# Patient Record
Sex: MALE | Race: WHITE | NOT HISPANIC OR LATINO | Employment: FULL TIME | URBAN - NONMETROPOLITAN AREA
[De-identification: names, ages, dates, MRNs, and addresses within clinical notes are randomized per-mention and may not be internally consistent; named-entity substitution may affect disease eponyms.]

---

## 2017-01-23 ENCOUNTER — TELEPHONE (OUTPATIENT)
Dept: UROLOGY | Facility: OTHER | Age: 32
End: 2017-01-23

## 2017-01-23 NOTE — TELEPHONE ENCOUNTER
"Please call patient back asap. Patient received message (from us) confirming his appointment time for tomorrow 1/24/17.  Patient thought his next appointment was not until \"sometime in February\" I explained that I did not see a February appointment set.  Patient wondered if \"someone here\" moved it up for him for some reason and did not notify him?? He could make the 1/24/17 appointment time - please call him to discuss as soon as possible.  Thank you  "

## 2017-01-24 ENCOUNTER — TELEPHONE (OUTPATIENT)
Dept: UROLOGY | Facility: OTHER | Age: 32
End: 2017-01-24

## 2017-01-24 NOTE — TELEPHONE ENCOUNTER
I talked to Nhan and he wanted to reschedule for Feb.  He will have his labs done on Feb. 10th before 10 am, which is atleast a week before his appt.

## 2017-01-24 NOTE — TELEPHONE ENCOUNTER
I notified patient's wife that I did the prior auth for testosterone, and will notify her when I get the approval/denial.  Maame Foster LPN

## 2017-02-15 ENCOUNTER — DOCUMENTATION ONLY (OUTPATIENT)
Dept: UROLOGY | Facility: OTHER | Age: 32
End: 2017-02-15

## 2017-02-15 ENCOUNTER — TELEPHONE (OUTPATIENT)
Dept: UROLOGY | Facility: OTHER | Age: 32
End: 2017-02-15

## 2017-02-15 NOTE — TELEPHONE ENCOUNTER
With written permission, I left a message explaining that I received a phone call from the insurance regarding the prior auth and the medication has been approved, and he can pick it up at his pharmacy, and if he has any questions, to call [phone].  Maame Foster LPN

## 2017-02-15 NOTE — PROGRESS NOTES
I spoke to patient's  today, his T was denied.  I have done everything correctly and resubmitted the information, it should not have been denied.  Janette Nguyen  CNP, CWOCN  Urology and Wound Care

## 2017-03-01 ENCOUNTER — TELEPHONE (OUTPATIENT)
Dept: UROLOGY | Facility: OTHER | Age: 32
End: 2017-03-01

## 2017-03-01 NOTE — TELEPHONE ENCOUNTER
With written permission, I left a message explaining that the prior auth denial has been overturned, and that I faxed the letter to his insurance company, and that he should be able to pick the Testosterone today, and if he has any questins,to call [phone].  Maame Foster LPN

## 2017-03-03 DIAGNOSIS — E29.1 TESTICULAR HYPOFUNCTION: ICD-10-CM

## 2017-03-03 LAB — HCT VFR BLD AUTO: 44.1 % (ref 40–53)

## 2017-03-03 PROCEDURE — 85014 HEMATOCRIT: CPT | Performed by: NURSE PRACTITIONER

## 2017-03-03 PROCEDURE — 99000 SPECIMEN HANDLING OFFICE-LAB: CPT | Performed by: NURSE PRACTITIONER

## 2017-03-03 PROCEDURE — 84403 ASSAY OF TOTAL TESTOSTERONE: CPT | Mod: 90 | Performed by: NURSE PRACTITIONER

## 2017-03-03 PROCEDURE — 36415 COLL VENOUS BLD VENIPUNCTURE: CPT | Performed by: NURSE PRACTITIONER

## 2017-03-08 LAB — TESTOST SERPL-MCNC: 403 NG/DL (ref 240–950)

## 2017-03-14 ENCOUNTER — OFFICE VISIT (OUTPATIENT)
Dept: UROLOGY | Facility: OTHER | Age: 32
End: 2017-03-14
Attending: NURSE PRACTITIONER
Payer: COMMERCIAL

## 2017-03-14 VITALS
SYSTOLIC BLOOD PRESSURE: 124 MMHG | BODY MASS INDEX: 46.65 KG/M2 | HEIGHT: 69 IN | HEART RATE: 96 BPM | WEIGHT: 315 LBS | DIASTOLIC BLOOD PRESSURE: 88 MMHG | TEMPERATURE: 98.8 F

## 2017-03-14 DIAGNOSIS — E29.1 TESTICULAR HYPOFUNCTION: ICD-10-CM

## 2017-03-14 PROCEDURE — 99213 OFFICE O/P EST LOW 20 MIN: CPT | Performed by: NURSE PRACTITIONER

## 2017-03-14 ASSESSMENT — PAIN SCALES - GENERAL: PAINLEVEL: NO PAIN (0)

## 2017-03-14 NOTE — MR AVS SNAPSHOT
After Visit Summary   3/14/2017    Nhan Sánchez    MRN: [id number]           Patient Information     Date Of Birth          1985        Visit Information        Provider Department      3/14/2017 4:00 PM Janette Nguyen NP [facility]        Today's Diagnoses     Testicular hypofunction          Care Instructions    Please have your labs done again in June and see me about a week later.    We may consider a small increase in your dose then.    Continue the same dose, I did reorder it.        Follow-ups after your visit        Who to contact     If you have questions or need follow up information about today's clinic visit or your schedule please contact [facility] directly at [phone].  Normal or non-critical lab and imaging results will be communicated to you by Zignalshart, letter or phone within 4 business days after the clinic has received the results. If you do not hear from us within 7 days, please contact the clinic through Zignalshart or phone. If you have a critical or abnormal lab result, we will notify you by phone as soon as possible.  Submit refill requests through DearLocal or call your pharmacy and they will forward the refill request to us. Please allow 3 business days for your refill to be completed.          Additional Information About Your Visit        MyChart Information     DearLocal gives you secure access to your electronic health record. If you see a primary care provider, you can also send messages to your care team and make appointments. If you have questions, please call your primary care clinic.  If you do not have a primary care provider, please call [phone] and they will assist you.        Care EveryWhere ID     This is your Care EveryWhere ID. This could be used by other organizations to access your [facility] medical records  [id number]        Your Vitals Were     Pulse Temperature Height BMI (Body Mass Index)          96 98.8  F (37.1  C)  "(Tympanic) 1.753 m (5' 9\") 48.73 kg/m2         Blood Pressure from Last 3 Encounters:   03/14/17 124/88   12/09/16 120/80   10/27/16 134/82    Weight from Last 3 Encounters:   03/14/17 (!) 149.7 kg (330 lb)   12/09/16 (!) 151.6 kg (334 lb 3.2 oz)   10/27/16 (!) 147.9 kg (326 lb)              Today, you had the following     No orders found for display       Primary Care Provider    None Specified       No primary provider on file.        Thank you!     Thank you for choosing [facility]  for your care. Our goal is always to provide you with excellent care. Hearing back from our patients is one way we can continue to improve our services. Please take a few minutes to complete the written survey that you may receive in the mail after your visit with us. Thank you!             Your Updated Medication List - Protect others around you: Learn how to safely use, store and throw away your medicines at www.disposemymeds.org.          This list is accurate as of: 3/14/17  4:31 PM.  Always use your most recent med list.                   Brand Name Dispense Instructions for use    escitalopram 20 MG tablet    LEXAPRO    30 tablet    Take 1 tablet (20 mg) by mouth daily       ibuprofen 800 MG tablet    ADVIL/MOTRIN    90 tablet    Take 1 tablet (800 mg) by mouth every 8 hours as needed for moderate pain       LORazepam 1 MG tablet    ATIVAN    60 tablet    One po BID PRN anxiety       testosterone cypionate 200 MG/ML injection    DEPOTESTOTERONE CYPIONATE    2 mL    Inject 1 mL (200 mg) into the muscle every 14 days         "

## 2017-03-14 NOTE — NURSING NOTE
"Chief Complaint   Patient presents with     RECHECK     Patient presents for follow up for low Testosterone.       Initial /88 (BP Location: Right arm, Patient Position: Chair, Cuff Size: Adult Large)  Pulse 96  Temp 98.8  F (37.1  C) (Tympanic)  Ht 1.753 m (5' 9\")  Wt (!) 149.7 kg (330 lb)  BMI 48.73 kg/m2 Estimated body mass index is 48.73 kg/(m^2) as calculated from the following:    Height as of this encounter: 1.753 m (5' 9\").    Weight as of this encounter: 149.7 kg (330 lb).  Medication Reconciliation: complete   Maame Foster LPN      "

## 2017-03-14 NOTE — PATIENT INSTRUCTIONS
Please have your labs done again in June and see me about a week later.    We may consider a small increase in your dose then.    Continue the same dose, I did reorder it.

## 2017-03-15 RX ORDER — TESTOSTERONE CYPIONATE 200 MG/ML
200 INJECTION, SOLUTION INTRAMUSCULAR
Qty: 2 ML | Refills: 3 | Status: SHIPPED | OUTPATIENT
Start: 2017-03-15 | End: 2019-01-21

## 2017-03-15 NOTE — PROGRESS NOTES
CC: low testosterone    HPI: Mr. Nhan Sánchez is a very pleasant 31 year old male seen today in the urology clinic for follow up regarding the need for testosterone supplementation.   He was last seen by myself on 10/26/16.  He was supposed to see me again at the end of January, but did not make it then.  His initial T (testosterone level) was low at 153.  His wife has been administering 200 mg of testosterone every 14 days at home.  His last T level was done on 3/3/17 and it was 403.  His hct is 44.1.   The primary symptoms the patient has in regards to his testicular hypofunction are severe lethargy and lack of libido.   He also has some minimal erectile dysfunction component with difficulty maintaining an erection.  He feels these symptoms are better with testosterone and would like to continue therapy.    Review Of Systems  Skin: tattoos  Eyes: negative  Ears/Nose/Throat: negative  Respiratory: Shortness of breath- at times even with little exertion  Cardiovascular: chest pain  Gastrointestinal: abdominal pain  Genitourinary: negative  Musculoskeletal: back pain and arthritis  Neurologic: headaches  Psychiatric: anxiety  Hematologic/Lymphatic/Immunologic: negative  Endocrine: negative    Current Outpatient Prescriptions   Medication     LORazepam (ATIVAN) 1 MG tablet     escitalopram (LEXAPRO) 20 MG tablet     ibuprofen (ADVIL,MOTRIN) 800 MG tablet     testosterone cypionate (DEPOTESTOTERONE CYPIONATE) 200 MG/ML injection     No current facility-administered medications for this visit.      Allergies   Allergen Reactions     Azithromycin Other (See Comments)     Zithromax - urticaria     Diphenhydramine Hcl Hives     Benadryl     Dust Mites        Past Surgical History   Procedure Laterality Date     [place] teeth extracted       Tonsillectomy       Circumcision       Colonoscopy N/A 4/11/2016     Procedure: COLONOSCOPY;  Surgeon: aMrely Hunt MD;  Location: [facility] OR     Past Medical History   Diagnosis Date  "    Cigarette nicotine dependence without complication 11/27/2015     Unspecified urticaria 12/12/2004     Social History     Social History     Marital status:      Spouse name: N/A     Number of children: N/A     Years of education: N/A     Occupational History     Not on file.     Social History Main Topics     Smoking status: Former Smoker     Packs/day: 0.50     Years: 13.00     Types: Cigarettes     Smokeless tobacco: Never Used      Comment: tried to quit - longest period tobacco free: 2 years - no passive smoke exposure     Alcohol use 0.0 oz/week     0 Standard drinks or equivalent per week      Comment: occasion     Drug use: No     Sexual activity: Not on file     Other Topics Concern     Blood Transfusions Yes     Caffeine Concern Yes     16 oz coffee, soda daily     Exercise Yes     walking, weights     Parent/Sibling W/ Cabg, Mi Or Angioplasty Before 65f 55m? No     Social History Narrative     Family History   Problem Relation Age of Onset     C.A.D. Other      family h/o     CEREBROVASCULAR DISEASE Other      CVA - family h/o       PHYSICAL EXAM:   /88 (BP Location: Right arm, Patient Position: Chair, Cuff Size: Adult Large)  Pulse 96  Temp 98.8  F (37.1  C) (Tympanic)  Ht 5' 9\" (1.753 m)  Wt (!) 330 lb (149.7 kg)  BMI 48.73 kg/m2  GENERAL: Well groomed, well developed, well nourished male in no obvious distress.  HEENT: extraocular movements intact, normocephalic.  SKIN: Warm to touch, dry.  No visible rashes or lesions on examined areas.  RESP: No increased respiratory effort. Lungs clear to auscultation bilateral.  CV: RRR, no murmurs/rubs/gallops.  LYMPH: No LE edema.  ABD: Soft, mild tenderness in LLQ, non-distended, no palpable masses.  Quiet BS.  No CVA tenderness bilaterally.  MS: Full ROM in extremities.  MINOO: deferred, done at the last visit  NEURO: Alert and oriented x 3.  PSYCH: Normal mood and affect, pleasant and agreeable during interview and exam.    ASSESSMENT/PLAN:  Mr." Nhan Sánchez is a very pleasant 31 year old male with a history of low testosterone with symptoms.    He would like to continue therapy.  I would like him to remain on the same dose.  He will have his labs done again sometime in June, mid way between injections and see me a week later.  I may consider a small increase in his dose if needed then.    I have enjoyed participating in the medical care of this very pleasant patient.  Using layterms, and diagrams when needed, I explained the pathophysiology, usual course, potential complications, recommended monitoring, preventive modalities, treatment rationale, risks, and benefits to Mr. Nhan Sánchez. The patient appeared to understand and all questions were satisfactorily answered.      Janette Nguyen  CNP, CWOCN  Urology and Wound Care  March 15, 2017

## 2017-04-04 DIAGNOSIS — F41.9 ANXIETY: ICD-10-CM

## 2017-04-06 RX ORDER — ESCITALOPRAM OXALATE 20 MG/1
TABLET ORAL
Qty: 30 TABLET | Refills: 5 | Status: SHIPPED | OUTPATIENT
Start: 2017-04-06 | End: 2019-01-20

## 2017-06-02 ENCOUNTER — TRANSFERRED RECORDS (OUTPATIENT)
Dept: HEALTH INFORMATION MANAGEMENT | Facility: HOSPITAL | Age: 32
End: 2017-06-02

## 2017-06-03 ENCOUNTER — TRANSFERRED RECORDS (OUTPATIENT)
Dept: HEALTH INFORMATION MANAGEMENT | Facility: HOSPITAL | Age: 32
End: 2017-06-03

## 2017-06-09 ENCOUNTER — OFFICE VISIT (OUTPATIENT)
Dept: FAMILY MEDICINE | Facility: OTHER | Age: 32
End: 2017-06-09
Attending: NURSE PRACTITIONER
Payer: COMMERCIAL

## 2017-06-09 VITALS
OXYGEN SATURATION: 94 % | RESPIRATION RATE: 20 BRPM | HEIGHT: 69 IN | TEMPERATURE: 98.8 F | DIASTOLIC BLOOD PRESSURE: 76 MMHG | HEART RATE: 100 BPM | WEIGHT: 315 LBS | BODY MASS INDEX: 46.65 KG/M2 | SYSTOLIC BLOOD PRESSURE: 162 MMHG

## 2017-06-09 DIAGNOSIS — S42.202A: ICD-10-CM

## 2017-06-09 DIAGNOSIS — T14.8XXA SKIN ABRASION: ICD-10-CM
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
## 2017-06-09 PROCEDURE — 99214 OFFICE O/P EST MOD 30 MIN: CPT | Performed by: NURSE PRACTITIONER

## 2017-06-09 RX ORDER — MUPIROCIN 20 MG/G
OINTMENT TOPICAL 3 TIMES DAILY
Qty: 60 G | Refills: 5 | Status: SHIPPED | OUTPATIENT
Start: 2017-06-09 | End: 2017-06-14

## 2017-06-09 ASSESSMENT — PAIN SCALES - GENERAL: PAINLEVEL: MILD PAIN (2)
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
## 2017-06-09 NOTE — NURSING NOTE
"Chief Complaint   Patient presents with     Musculoskeletal Problem     follow up  wants redreased gauze raodrash on left arm       Hypertension     elevated bp        Initial /76 (BP Location: Right arm, Patient Position: Chair, Cuff Size: Adult Large)  Pulse 100  Temp 98.8  F (37.1  C) (Tympanic)  Resp 20  Ht 5' 9\" (1.753 m)  Wt (!) 339 lb 14.4 oz (154.2 kg)  SpO2 94%  BMI 50.19 kg/m2 Estimated body mass index is 50.19 kg/(m^2) as calculated from the following:    Height as of this encounter: 5' 9\" (1.753 m).    Weight as of this encounter: 339 lb 14.4 oz (154.2 kg).  Medication Reconciliation: complete   Pamela M Lechevalier LPN      "
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
## 2017-06-15 ENCOUNTER — TELEPHONE (OUTPATIENT)
Dept: FAMILY MEDICINE | Facility: OTHER | Age: 32
End: 2017-06-15

## 2017-06-15 NOTE — TELEPHONE ENCOUNTER
Pt states left arm healed and right arm closing up and swelling down has still been doing daily dressing changes. Feels 100% better.  Pamela M Lechevalier LPN

## 2017-06-26 ENCOUNTER — OFFICE VISIT (OUTPATIENT)
Dept: FAMILY MEDICINE | Facility: OTHER | Age: 32
End: 2017-06-26
Attending: NURSE PRACTITIONER
Payer: COMMERCIAL

## 2017-06-26 VITALS
HEIGHT: 69 IN | HEART RATE: 100 BPM | SYSTOLIC BLOOD PRESSURE: 126 MMHG | WEIGHT: 315 LBS | OXYGEN SATURATION: 97 % | RESPIRATION RATE: 16 BRPM | BODY MASS INDEX: 46.65 KG/M2 | TEMPERATURE: 98.1 F | DIASTOLIC BLOOD PRESSURE: 78 MMHG

## 2017-06-26 DIAGNOSIS — M25.522 LEFT ELBOW PAIN: Primary | ICD-10-CM

## 2017-06-26 DIAGNOSIS — L03.114 CELLULITIS OF LEFT ARM: ICD-10-CM

## 2017-06-26 PROCEDURE — 73070 X-RAY EXAM OF ELBOW: CPT | Mod: TC | Performed by: RADIOLOGY

## 2017-06-26 PROCEDURE — 99213 OFFICE O/P EST LOW 20 MIN: CPT | Performed by: NURSE PRACTITIONER

## 2017-06-26 RX ORDER — CLINDAMYCIN HCL 150 MG
150 CAPSULE ORAL 3 TIMES DAILY
Qty: 30 CAPSULE | Refills: 0 | Status: SHIPPED | OUTPATIENT
Start: 2017-06-26 | End: 2019-01-20

## 2017-06-26 ASSESSMENT — PAIN SCALES - GENERAL: PAINLEVEL: MILD PAIN (2)

## 2017-06-26 NOTE — MR AVS SNAPSHOT
After Visit Summary   6/26/2017    Nhan Sánchez    MRN: [id number]           Patient Information     Date Of Birth          1985        Visit Information        Provider Department      6/26/2017 11:30 AM Chelsey Cornejo NP [facility]        Today's Diagnoses     Left elbow pain    -  1    Cellulitis of left arm          Care Instructions      ASSESSMENT/PLAN:         1. Left elbow pain  No evidence of osteomyelitis on XR  - XR ELBOW LT 2 VW (Clinic Performed); Future  - XR ELBOW LT 2 VW (Clinic Performed)    2. Cellulitis of left arm  Continue Bactroban, wrap as discussed  - clindamycin (CLEOCIN) 150 MG capsule; Take 1 capsule (150 mg) by mouth 3 times daily  Dispense: 30 capsule; Refill: 0    Continue following with ortho as scheduled    FUTURE APPOINTMENTS:       - Follow-up visit if no improvement or any worsening.     Chelsey Cornejo NP  [facility]            Follow-ups after your visit        Who to contact     If you have questions or need follow up information about today's clinic visit or your schedule please contact [facility] directly at [phone].  Normal or non-critical lab and imaging results will be communicated to you by [employer], letter or phone within 4 business days after the clinic has received the results. If you do not hear from us within 7 days, please contact the clinic through Swifto[employer] or phone. If you have a critical or abnormal lab result, we will notify you by phone as soon as possible.  Submit refill requests through [employer] or call your pharmacy and they will forward the refill request to us. Please allow 3 business days for your refill to be completed.          Additional Information About Your Visit        MyChart Information     [employer] gives you secure access to your electronic health record. If you see a primary care provider, you can also send messages to your care team and make appointments. If you  "have questions, please call your primary care clinic.  If you do not have a primary care provider, please call [phone] and they will assist you.        Care EveryWhere ID     This is your Care EveryWhere ID. This could be used by other organizations to access your [facility] medical records  [id number]        Your Vitals Were     Pulse Temperature Respirations Height Pulse Oximetry BMI (Body Mass Index)    100 98.1  F (36.7  C) (Tympanic) 16 5' 9\" (1.753 m) 97% 48.14 kg/m2       Blood Pressure from Last 3 Encounters:   06/26/17 126/78   06/09/17 162/76   03/14/17 124/88    Weight from Last 3 Encounters:   06/26/17 (!) 326 lb (147.9 kg)   06/09/17 (!) 339 lb 14.4 oz (154.2 kg)   03/14/17 (!) 330 lb (149.7 kg)              We Performed the Following     XR ELBOW LT 2 VW (Clinic Performed)          Today's Medication Changes          These changes are accurate as of: 6/26/17 11:44 AM.  If you have any questions, ask your nurse or doctor.               Start taking these medicines.        Dose/Directions    clindamycin 150 MG capsule   Commonly known as:  CLEOCIN   Used for:  Cellulitis of left arm   Started by:  Chelsey Cornejo, MATTHIEU        Dose:  150 mg   Take 1 capsule (150 mg) by mouth 3 times daily   Quantity:  30 capsule   Refills:  0            Where to get your medicines      These medications were sent to [facility] [address] AT [facility] [address]  [address] [facility] [address]     Phone:  [phone]     clindamycin 150 MG capsule                Primary Care Provider    None Specified       No primary provider on file.        Equal Access to Services     RAHUL DENTON AH: Kristina Ramsay, mere bateman, jennifer saini, chris baptiste. So [facility] [phone].    ATENCIÓN: Si habla español, tiene a li disposición servicios gratuitos de asistencia lingüística. Llame al [phone].    We " comply with applicable federal civil rights laws and [place] laws. We do not discriminate on the basis of race, color, national origin, age, disability sex, sexual orientation or gender identity.            Thank you!     Thank you for choosing [facility]  for your care. Our goal is always to provide you with excellent care. Hearing back from our patients is one way we can continue to improve our services. Please take a few minutes to complete the written survey that you may receive in the mail after your visit with us. Thank you!             Your Updated Medication List - Protect others around you: Learn how to safely use, store and throw away your medicines at www.disposemymeds.org.          This list is accurate as of: 6/26/17 11:44 AM.  Always use your most recent med list.                   Brand Name Dispense Instructions for use Diagnosis    ASPIRIN PO      Take 81 mg by mouth        clindamycin 150 MG capsule    CLEOCIN    30 capsule    Take 1 capsule (150 mg) by mouth 3 times daily    Cellulitis of left arm       escitalopram 20 MG tablet    LEXAPRO    30 tablet    TAKE 1 TABLET BY MOUTH EVERY DAY    Anxiety       ibuprofen 800 MG tablet    ADVIL/MOTRIN    90 tablet    Take 1 tablet (800 mg) by mouth every 8 hours as needed for moderate pain    Back strain, initial encounter       LORazepam 1 MG tablet    ATIVAN    60 tablet    One po BID PRN anxiety    Anxiety       order for DME     1 each    Xerofrom petrolatum gauze - 8 daily dispense qty 80 plain sterile gauze - 4 per per day, dispense qty 40 Coban 4 inch - 4 per day, dispense 40   Change dressing daily until healed.    Skin abrasion       OXYCODONE HCL PO      Take 5 mg by mouth        testosterone cypionate 200 MG/ML injection    DEPOTESTOTERONE    2 mL    Inject 1 mL (200 mg) into the muscle every 14 days    Testicular hypofunction
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
## 2017-06-26 NOTE — PATIENT INSTRUCTIONS
ASSESSMENT/PLAN:         1. Left elbow pain  No evidence of osteomyelitis on XR  - XR ELBOW LT 2 VW (Clinic Performed); Future  - XR ELBOW LT 2 VW (Clinic Performed)    2. Cellulitis of left arm  Continue Bactroban, wrap as discussed  - clindamycin (CLEOCIN) 150 MG capsule; Take 1 capsule (150 mg) by mouth 3 times daily  Dispense: 30 capsule; Refill: 0    Continue following with ortho as scheduled    FUTURE APPOINTMENTS:       - Follow-up visit if no improvement or any worsening.     Chelsey Cornejo, NP  [facility]

## 2019-01-21 ENCOUNTER — OFFICE VISIT (OUTPATIENT)
Dept: FAMILY MEDICINE | Facility: OTHER | Age: 34
End: 2019-01-21
Attending: NURSE PRACTITIONER
Payer: COMMERCIAL

## 2019-01-21 VITALS
DIASTOLIC BLOOD PRESSURE: 78 MMHG | WEIGHT: 230 LBS | TEMPERATURE: 99 F | HEART RATE: 94 BPM | BODY MASS INDEX: 34.07 KG/M2 | SYSTOLIC BLOOD PRESSURE: 118 MMHG | RESPIRATION RATE: 14 BRPM | OXYGEN SATURATION: 95 % | HEIGHT: 69 IN

## 2019-01-21 DIAGNOSIS — J98.01 BRONCHOSPASM: ICD-10-CM

## 2019-01-21 DIAGNOSIS — R05.9 COUGH: Primary | ICD-10-CM

## 2019-01-21 PROCEDURE — 99213 OFFICE O/P EST LOW 20 MIN: CPT | Performed by: NURSE PRACTITIONER

## 2019-01-21 RX ORDER — ALBUTEROL SULFATE 90 UG/1
2 AEROSOL, METERED RESPIRATORY (INHALATION) EVERY 6 HOURS
Qty: 1 INHALER | Refills: 1 | Status: SHIPPED | OUTPATIENT
Start: 2019-01-21 | End: 2020-08-31

## 2019-01-21 ASSESSMENT — PATIENT HEALTH QUESTIONNAIRE - PHQ9
SUM OF ALL RESPONSES TO PHQ QUESTIONS 1-9: 1
5. POOR APPETITE OR OVEREATING: NOT AT ALL

## 2019-01-21 ASSESSMENT — MIFFLIN-ST. JEOR: SCORE: 1983.65

## 2019-01-21 ASSESSMENT — PAIN SCALES - GENERAL: PAINLEVEL: NO PAIN (0)

## 2019-01-21 ASSESSMENT — ANXIETY QUESTIONNAIRES
6. BECOMING EASILY ANNOYED OR IRRITABLE: SEVERAL DAYS
IF YOU CHECKED OFF ANY PROBLEMS ON THIS QUESTIONNAIRE, HOW DIFFICULT HAVE THESE PROBLEMS MADE IT FOR YOU TO DO YOUR WORK, TAKE CARE OF THINGS AT HOME, OR GET ALONG WITH OTHER PEOPLE: NOT DIFFICULT AT ALL
5. BEING SO RESTLESS THAT IT IS HARD TO SIT STILL: NOT AT ALL
7. FEELING AFRAID AS IF SOMETHING AWFUL MIGHT HAPPEN: NOT AT ALL
3. WORRYING TOO MUCH ABOUT DIFFERENT THINGS: SEVERAL DAYS
1. FEELING NERVOUS, ANXIOUS, OR ON EDGE: SEVERAL DAYS
2. NOT BEING ABLE TO STOP OR CONTROL WORRYING: NOT AT ALL
GAD7 TOTAL SCORE: 3

## 2019-01-21 NOTE — NURSING NOTE
"Chief Complaint   Patient presents with     Cough       Initial /78 (BP Location: Left arm, Patient Position: Sitting, Cuff Size: Adult Large)   Pulse 94   Temp 99  F (37.2  C) (Tympanic)   Resp 14   Ht 1.753 m (5' 9\")   Wt 104.3 kg (230 lb)   SpO2 95%   BMI 33.97 kg/m   Estimated body mass index is 33.97 kg/m  as calculated from the following:    Height as of this encounter: 1.753 m (5' 9\").    Weight as of this encounter: 104.3 kg (230 lb).  Medication Reconciliation: complete    Kerry Champagne LPN    "

## 2019-01-21 NOTE — PATIENT INSTRUCTIONS
ASSESSMENT/PLAN:       1. Cough  2. Bronchospasm  - albuterol (PROAIR HFA/PROVENTIL HFA/VENTOLIN HFA) 108 (90 Base) MCG/ACT inhaler; Inhale 2 puffs into the lungs every 6 hours  Dispense: 1 Inhaler; Refill: 1      Fluids  Rest  Temp control at home  Humidity at home - add bacteriostatic solution to humidifier  Add Zicam or other zinc daily until you feel better  To ER or UC if symptoms increase  Return if you do not improve          Emely Perez NP  [facility]

## 2019-01-21 NOTE — PROGRESS NOTES
SUBJECTIVE:   Nhan Sánchez is a 34 year old male who presents to clinic today for the following health issues:          Acute Illness   Acute illness concerns: cough  Onset: 2 months    Fever: no     Chills/Sweats: no     Headache (location?): YES    Sinus Pressure:no    Conjunctivitis:  no    Ear Pain: no    Rhinorrhea: YES- sometimes    Congestion: no     Sore Throat: no      Cough: YES    Wheeze: YES- until cleared with a cough    Decreased Appetite: no     Nausea: no     Vomiting: no     Diarrhea:  no     Dysuria/Freq.: no     Fatigue/Achiness: no     Sick/Strep Exposure: YES- child has sinus     Therapies Tried and outcome: none          PHQ-9 SCORE 12/9/2016   PHQ-9 Total Score 4     AL-7 SCORE 12/9/2016   Total Score 13         Problem list and histories reviewed & adjusted, as indicated.  Additional history: as documented    Patient Active Problem List   Diagnosis     ACP (advance care planning)     Past Surgical History:   Procedure Laterality Date     CIRCUMCISION       COLONOSCOPY N/A 4/11/2016    Procedure: COLONOSCOPY;  Surgeon: Marely Hunt MD;  Location: [facility]     TONSILLECTOMY       wisdom teeth extracted         Social History     Tobacco Use     Smoking status: Former Smoker     Packs/day: 0.50     Years: 13.00     Pack years: 6.50     Types: Cigarettes     Smokeless tobacco: Never Used     Tobacco comment: tried to quit - longest period tobacco free: 2 years - no passive smoke exposure   Substance Use Topics     Alcohol use: Yes     Alcohol/week: 0.0 oz     Comment: occasion     Family History   Problem Relation Age of Onset     C.A.D. Other         family h/o     Cerebrovascular Disease Other         CVA - family h/o         No current outpatient medications on file.     Allergies   Allergen Reactions     Azithromycin Other (See Comments) and Hives     Zithromax - urticaria     Diphenhydramine Hcl Hives     Benadryl     Dust Mites      Recent Labs   Lab Test 10/14/16  1719 03/21/16  1608  "  ALT 70 88*   CR 0.98 0.94   GFRESTIMATED >90  Non  GFR Calc   >90  Non  GFR Calc     GFRESTBLACK >90   GFR Calc   >90   GFR Calc     POTASSIUM 4.3 4.1   TSH 3.48 6.96*      BP Readings from Last 3 Encounters:   01/21/19 118/78   06/26/17 126/78   06/09/17 162/76    Wt Readings from Last 3 Encounters:   01/21/19 104.3 kg (230 lb)   06/26/17 (!) 147.9 kg (326 lb)   06/09/17 (!) 154.2 kg (339 lb 14.4 oz)                  Labs reviewed in EPIC    Reviewed and updated as needed this visit by clinical staff  Tobacco  Allergies  Meds       Reviewed and updated as needed this visit by Provider         ROS:  Constitutional, HEENT, cardiovascular, pulmonary, gi and gu systems are negative, except as otherwise noted.    OBJECTIVE:     /78 (BP Location: Left arm, Patient Position: Sitting, Cuff Size: Adult Large)   Pulse 94   Temp 99  F (37.2  C) (Tympanic)   Resp 14   Ht 1.753 m (5' 9\")   Wt 104.3 kg (230 lb)   SpO2 95%   BMI 33.97 kg/m    Body mass index is 33.97 kg/m .     GENERAL: healthy, alert and no distress  NECK: no adenopathy, no asymmetry, masses, or scars and thyroid normal to palpation  RESP: lungs clear to auscultation - no rales, rhonchi or wheezes. Dry cough  CV: regular rate and rhythm, normal S1 S2, no S3 or S4, no murmur, click or rub, no peripheral edema and peripheral pulses strong  MS: no gross musculoskeletal defects noted, no edema  SKIN: no suspicious lesions or rashes        ASSESSMENT/PLAN:       1. Cough  2. Bronchospasm  - albuterol (PROAIR HFA/PROVENTIL HFA/VENTOLIN HFA) 108 (90 Base) MCG/ACT inhaler; Inhale 2 puffs into the lungs every 6 hours  Dispense: 1 Inhaler; Refill: 1      Fluids  Rest  Temp control at home  Humidity at home - add bacteriostatic solution to humidifier  Add Zicam or other zinc daily until you feel better  To ER or UC if symptoms increase  Return if you do not improve          Emely Perez" NP  [facility]

## 2019-01-22 ASSESSMENT — ANXIETY QUESTIONNAIRES: GAD7 TOTAL SCORE: 3

## 2020-03-02 ENCOUNTER — HEALTH MAINTENANCE LETTER (OUTPATIENT)
Age: 35
End: 2020-03-02

## 2020-08-31 ENCOUNTER — VIRTUAL VISIT (OUTPATIENT)
Dept: FAMILY MEDICINE | Facility: OTHER | Age: 35
End: 2020-08-31
Attending: NURSE PRACTITIONER
Payer: COMMERCIAL

## 2020-08-31 VITALS — BODY MASS INDEX: 46.65 KG/M2 | WEIGHT: 315 LBS | HEIGHT: 69 IN

## 2020-08-31 DIAGNOSIS — L98.9 FACIAL LESION: Primary | ICD-10-CM

## 2020-08-31 DIAGNOSIS — D22.4 MELANOCYTIC NEVUS OF NECK: ICD-10-CM

## 2020-08-31 PROCEDURE — 99213 OFFICE O/P EST LOW 20 MIN: CPT | Mod: 95 | Performed by: NURSE PRACTITIONER

## 2020-08-31 ASSESSMENT — ANXIETY QUESTIONNAIRES
2. NOT BEING ABLE TO STOP OR CONTROL WORRYING: NOT AT ALL
4. TROUBLE RELAXING: SEVERAL DAYS
6. BECOMING EASILY ANNOYED OR IRRITABLE: SEVERAL DAYS
7. FEELING AFRAID AS IF SOMETHING AWFUL MIGHT HAPPEN: NOT AT ALL
1. FEELING NERVOUS, ANXIOUS, OR ON EDGE: NOT AT ALL
3. WORRYING TOO MUCH ABOUT DIFFERENT THINGS: NOT AT ALL
GAD7 TOTAL SCORE: 2
IF YOU CHECKED OFF ANY PROBLEMS ON THIS QUESTIONNAIRE, HOW DIFFICULT HAVE THESE PROBLEMS MADE IT FOR YOU TO DO YOUR WORK, TAKE CARE OF THINGS AT HOME, OR GET ALONG WITH OTHER PEOPLE: NOT DIFFICULT AT ALL
5. BEING SO RESTLESS THAT IT IS HARD TO SIT STILL: NOT AT ALL

## 2020-08-31 ASSESSMENT — PAIN SCALES - GENERAL: PAINLEVEL: NO PAIN (0)

## 2020-08-31 ASSESSMENT — PATIENT HEALTH QUESTIONNAIRE - PHQ9: SUM OF ALL RESPONSES TO PHQ QUESTIONS 1-9: 3

## 2020-08-31 ASSESSMENT — MIFFLIN-ST. JEOR: SCORE: 2409.57

## 2020-08-31 NOTE — PROGRESS NOTES
"Nhan Sánchez is a 35 year old male who is being evaluated via a billable telephone visit.      The patient has been notified of following:     \"This telephone visit will be conducted via a call between you and your physician/provider. We have found that certain health care needs can be provided without the need for a physical exam.  This service lets us provide the care you need with a short phone conversation.  If a prescription is necessary we can send it directly to your pharmacy.  If lab work is needed we can place an order for that and you can then stop by our lab to have the test done at a later time.    Telephone visits are billed at different rates depending on your insurance coverage. During this emergency period, for some insurers they may be billed the same as an in-person visit.  Please reach out to your insurance provider with any questions.    If during the course of the call the physician/provider feels a telephone visit is not appropriate, you will not be charged for this service.\"    Patient has given verbal consent for Telephone visit?  Yes    What phone number would you like to be contacted at? [phone]    How would you like to obtain your AVS? Estefani      Nhan Sánchez is a 35 year old male who presents via phone visit today for the following health issues:        Concern - Facial lesion (nose)  and two nevus on neck - changing color, dark  Onset: ongoing   Description: mole appears and are odd shaped   Intensity: mild  Progression of Symptoms:  worsening  Accompanying Signs & Symptoms: none  Previous history of similar problem: none  Precipitating factors:        Worsened by: none  Alleviating factors:        Improved by: none  Therapies tried and outcome:  none         Patient Active Problem List   Diagnosis     ACP (advance care planning)     Past Surgical History:   Procedure Laterality Date     CIRCUMCISION       COLONOSCOPY N/A 4/11/2016    Procedure: COLONOSCOPY;  Surgeon: Gilbert" Marely BESS MD;  Location: [facility]     TONSILLECTOMY       wisdom teeth extracted         Social History     Tobacco Use     Smoking status: Former Smoker     Packs/day: 0.50     Years: 13.00     Pack years: 6.50     Types: Cigarettes     Smokeless tobacco: Never Used     Tobacco comment: tried to quit - longest period tobacco free: 2 years - no passive smoke exposure   Substance Use Topics     Alcohol use: Yes     Alcohol/week: 0.0 standard drinks     Comment: occasion     Family History   Problem Relation Age of Onset     C.A.D. Other         family h/o     Cerebrovascular Disease Other         CVA - family h/o           No current outpatient medications on file.       Allergies   Allergen Reactions     Azithromycin Other (See Comments) and Hives     Zithromax - urticaria     Diphenhydramine Hcl Hives     Benadryl     Dust Mites          Recent Labs   Lab Test 10/14/16  1719 03/21/16  1608   ALT 70 88*   CR 0.98 0.94   GFRESTIMATED >90  Non  GFR Calc   >90  Non  GFR Calc     GFRESTBLACK >90   GFR Calc   >90   GFR Calc     POTASSIUM 4.3 4.1   TSH 3.48 6.96*        BP Readings from Last 3 Encounters:   01/21/19 118/78   06/26/17 126/78   06/09/17 162/76    Wt Readings from Last 3 Encounters:   08/31/20 147.4 kg (325 lb)   01/21/19 104.3 kg (230 lb)   06/26/17 (!) 147.9 kg (326 lb)             Review of Systems   Constitutional, HEENT, cardiovascular, pulmonary, gi and gu systems are negative, except as otherwise noted.         Objective      Vitals:  No vitals were obtained today due to virtual visit.  healthy, alert and no distress  PSYCH: Alert and oriented times 3; coherent speech, normal   rate and volume, able to articulate logical thoughts, able   to abstract reason, no tangential thoughts, no hallucinations   or delusions  His affect is normal  RESP: No cough, no audible wheezing, able to talk in full sentences  Remainder of exam unable to be  completed due to telephone visits            Assessment & Plan     Facial lesion  - DERMATOLOGY ADULT REFERRAL; Future    Melanocytic nevus of neck  - DERMATOLOGY ADULT REFERRAL; Future       Phone call duration:  11  minutes    Emely Perez CNP  [facility]

## 2020-08-31 NOTE — PROGRESS NOTES
Nhan Sánchez is a 35 year old male who presents to clinic today for the following health issues:    HPI     PHQ9 and AL    {SUPERLIST (Optional):686928}      {additonal problems for provider to add (Optional):801476}        Patient Active Problem List   Diagnosis     ACP (advance care planning)     Past Surgical History:   Procedure Laterality Date     CIRCUMCISION       COLONOSCOPY N/A 4/11/2016    Procedure: COLONOSCOPY;  Surgeon: Marely Hunt MD;  Location: [facility]     TONSILLECTOMY       wisdom teeth extracted         Social History     Tobacco Use     Smoking status: Former Smoker     Packs/day: 0.50     Years: 13.00     Pack years: 6.50     Types: Cigarettes     Smokeless tobacco: Never Used     Tobacco comment: tried to quit - longest period tobacco free: 2 years - no passive smoke exposure   Substance Use Topics     Alcohol use: Yes     Alcohol/week: 0.0 standard drinks     Comment: occasion     Family History   Problem Relation Age of Onset     C.A.D. Other         family h/o     Cerebrovascular Disease Other         CVA - family h/o           Current Outpatient Medications   Medication Sig Dispense Refill     albuterol (PROAIR HFA/PROVENTIL HFA/VENTOLIN HFA) 108 (90 Base) MCG/ACT inhaler Inhale 2 puffs into the lungs every 6 hours 1 Inhaler 1       Allergies   Allergen Reactions     Azithromycin Other (See Comments) and Hives     Zithromax - urticaria     Diphenhydramine Hcl Hives     Benadryl     Dust Mites        Recent Labs   Lab Test 10/14/16  1719 03/21/16  1608   ALT 70 88*   CR 0.98 0.94   GFRESTIMATED >90  Non  GFR Calc   >90  Non  GFR Calc     GFRESTBLACK >90   GFR Calc   >90   GFR Calc     POTASSIUM 4.3 4.1   TSH 3.48 6.96*        BP Readings from Last 3 Encounters:   01/21/19 118/78   06/26/17 126/78   06/09/17 162/76    Wt Readings from Last 3 Encounters:   01/21/19 104.3 kg (230 lb)   06/26/17 (!) 147.9 kg (326 lb)    06/09/17 (!) 154.2 kg (339 lb 14.4 oz)                    Review of Systems   {ROS COMP (Optional):972647}      Objective    There were no vitals taken for this visit.  There is no height or weight on file to calculate BMI.       Physical Exam   {Exam List (Optional):412484}    {Diagnostic Test Results (Optional):437831}        {PROVIDER CHARTING PREFERENCE:470785}

## 2020-08-31 NOTE — PATIENT INSTRUCTIONS
Assessment & Plan     Facial lesion  - DERMATOLOGY ADULT REFERRAL; Future    Melanocytic nevus of neck  - DERMATOLOGY ADULT REFERRAL; Future       Emely Perez, CNP  [facility]

## 2020-09-01 ASSESSMENT — ANXIETY QUESTIONNAIRES: GAD7 TOTAL SCORE: 2

## 2020-11-12 ENCOUNTER — OFFICE VISIT (OUTPATIENT)
Dept: FAMILY MEDICINE | Facility: OTHER | Age: 35
End: 2020-11-12
Attending: NURSE PRACTITIONER
Payer: COMMERCIAL

## 2020-11-12 ENCOUNTER — ANCILLARY PROCEDURE (OUTPATIENT)
Dept: GENERAL RADIOLOGY | Facility: OTHER | Age: 35
End: 2020-11-12
Attending: NURSE PRACTITIONER
Payer: COMMERCIAL

## 2020-11-12 VITALS
HEART RATE: 89 BPM | OXYGEN SATURATION: 95 % | SYSTOLIC BLOOD PRESSURE: 128 MMHG | DIASTOLIC BLOOD PRESSURE: 88 MMHG | BODY MASS INDEX: 46.65 KG/M2 | TEMPERATURE: 98.8 F | HEIGHT: 69 IN | WEIGHT: 315 LBS

## 2020-11-12 DIAGNOSIS — M54.50 LUMBAR BACK PAIN: Primary | ICD-10-CM

## 2020-11-12 DIAGNOSIS — E66.01 MORBID OBESITY (H): ICD-10-CM

## 2020-11-12 PROCEDURE — 99214 OFFICE O/P EST MOD 30 MIN: CPT | Performed by: NURSE PRACTITIONER

## 2020-11-12 PROCEDURE — 72100 X-RAY EXAM L-S SPINE 2/3 VWS: CPT | Mod: TC | Performed by: RADIOLOGY

## 2020-11-12 RX ORDER — CYCLOBENZAPRINE HCL 10 MG
10 TABLET ORAL 3 TIMES DAILY PRN
Qty: 60 TABLET | Refills: 0 | Status: SHIPPED | OUTPATIENT
Start: 2020-11-12 | End: 2020-12-12

## 2020-11-12 RX ORDER — IBUPROFEN 800 MG/1
800 TABLET, FILM COATED ORAL EVERY 8 HOURS PRN
Qty: 90 TABLET | Refills: 0 | Status: SHIPPED | OUTPATIENT
Start: 2020-11-12 | End: 2020-12-12

## 2020-11-12 ASSESSMENT — MIFFLIN-ST. JEOR: SCORE: 2532.04

## 2020-11-12 ASSESSMENT — PAIN SCALES - GENERAL: PAINLEVEL: MODERATE PAIN (5)

## 2020-11-12 NOTE — PATIENT INSTRUCTIONS
Patient Education     Understanding Body Mass Index (BMI)   Body mass index (BMI) is a way to figure out your weight category. It uses the ratio of your height to your weight. The BMI is a measure of your weight that is corrected for height. Knowing your BMI is a way to tell if you are at a healthy weight, are underweight, or overweight. The higher your BMI, the greater your risk for weight-related health problems.  What BMI means for adults    BMI below 18.5: Underweight    BMI 18.5 to 24.9: Healthy weight or ideal body weight     BMI 25 to 29.9: Overweight    BMI 30 and over: Obese    BMI 40 and over: Severe obesity        Find your BMI with an online BMI calculator tool, such as these from the CDC:    BMI calculator for adults    BMI calculator for children and teens  Using the BMI chart  To figure out your BMI, find your height and weight (or the numbers closest to them) on the table below. Follow each column of numbers to where your height and weight meet on the table. That is your BMI.    [employer] last reviewed this educational content on 9/1/2019 2000-2020 [employer]. [address] [place] [address]. All rights reserved. This information is not intended as a substitute for professional medical care. Always follow your healthcare professional's instructions.           Patient Education     Relieving Back Pain  Back pain is a common problem. You can strain back muscles by lifting too much weight or just by moving the wrong way. Back strain can be uncomfortable, even painful. And it can take weeks or months to improve. To help yourself feel better and prevent future back strains, try these tips.   Important: Don't give aspirin to children or teens without first discussing it with your child's healthcare provider.   Ice    Ice reduces muscle pain and swelling. It helps most during the first 24 to 48 hours after an injury.     Wrap an ice pack or a bag of frozen peas in a thin towel.  Never put ice directly on your skin.    Place the ice where your back hurts the most.    Don t ice for more than 20 minutes at a time.    You can use ice several times a day.  Medicines  Over-the-counter pain relievers include acetaminophen and anti-inflammatory medicines, which includes aspirin, naproxen, or ibuprofen. They can help ease discomfort. Some also reduce swelling.     Tell your healthcare provider about any medicines you are already taking.    Take medicines only as directed.  Manipulation and massage  Having manipulation by an osteopathic doctor or chiropractor may be helpful. Getting a massage also may help.    Heat  After the first 48 hours, heat can relax sore muscles and improve blood flow.    Try a warm bath or shower. Or use a heating pad set on low. To prevent a burn, keep a cloth between you and the heating pad.    Don t use a heating pad for more than 15 minutes at a time. Never sleep on a heating pad.  [employer] last reviewed this educational content on 6/1/2018 2000-2020 [employer]. [address] [place] [address]. All rights reserved. This information is not intended as a substitute for professional medical care. Always follow your healthcare professional's instructions.           Patient Education     Back Safety: Poor Posture Hurts  An unhealthy spine often starts with bad habits. Poor movement patterns and posture problems are common causes of back pain. Disk, bone, nerve, and soft tissue problems can all be affected by poor posture. They can lead to pain, stiffness, and other symptoms.     Poor posture backfires  Poor posture can cause pain. Too much slouching puts increased pressure on the disks. An excessive lumbar curve can overload and inflame the vertebrae. As a result, the back muscles may tighten or spasm to  splint  and protect the spine. This adds to the pain you feel.     Proper posture: The key to safe movement  Your spine bears your weight  throughout the day. This is true whether you re sleeping, standing, or bending. Certain positions strain your spine more than others. But by maintaining proper posture in all positions, you can reduce the stress on your spine.     To improve your standing posture, follow these steps:    Breathe deeply.    Relax your shoulders, hips, and ankles.   Think of the ears, shoulders, hips, and ankles as a series of dots. Now, adjust your body to connect the dots in a straight line.    Tuck your buttocks in just a bit if you need to.   [employer] last reviewed this educational content on 5/1/2018 2000-2020 [employer]. [address] [place] [address], [place] [id number]. All rights reserved. This information is not intended as a substitute for professional medical care. Always follow your healthcare professional's instructions.

## 2020-11-12 NOTE — PROGRESS NOTES
Subjective     Nhan Sánchez is a 35 year old male who presents to clinic today for the following health issues:    HPI         Back Pain  Onset/Duration: about a month ago he fell and he landed on his right side. Immediately following he was continued on with his day. He was able to walk. Since then the pain has been in his low back on both sides. L>R.  He reports that it feels like his muscle is being ripped off across his low back. Pain is sharp and constant and radiates into the both sides of back, into  Buttocks on both sides leg and into the backs of his legs  L>R. He left work for the first time yesterday because the pain was too great.   New numbness or weakness in legs, not attributed to pain: YES  Intensity: Currently 5/10  Progression of Symptoms: worsening  History:   Specific cause: states fell off a bull dozer at work, but did not report it declines work comp  Pain interferes with job: YES  History of back problems: Has history of sciatica in the past and has been to the chiropractor but this is different.   Any previous MRI or X-rays: None  Sees a specialist for back pain: Has been to chiropractor but not for this specific pain. Has not been worked up for this.   Alleviating factors:   Improved by: none    Precipitating factors:  Worsened by: Lifting, Bending, Standing, Sitting, Lying Flat, Walking and Coughing  Therapies tried and outcome: NSAIDs and stretch    Accompanying Signs & Symptoms:  Risk of Fracture: yes  Risk of Cauda Equina: None  Risk of Infection: None  Risk of Cancer: None  Risk of Ankylosing Spondylitis: Onset at age <35, male, AND morning back stiffness no    Denies numbness or tingling of genitals and thighs, no incontinence of bowel/bladder.     {Review of Systems   Constitutional, HEENT, cardiovascular, pulmonary, gi and gu systems are negative, except as otherwise noted.      Objective    /88 (BP Location: Right arm, Patient Position: Chair, Cuff Size: Adult Large)   Pulse  "89   Temp 98.8  F (37.1  C) (Temporal)   Ht 1.753 m (5' 9\")   Wt (!) 159.7 kg (352 lb)   SpO2 95%   BMI 51.98 kg/m    Body mass index is 51.98 kg/m .     Physical Exam   GENERAL: obese, alert and moderate distress  MS:  no edema or erythema. No wounds. Difficutly moving from sitting to standing, using arms to assist and turning partially to left to stand up. Ambulates with knees slightly flexed.Spine exam shows  tenderness to palpation at L4, L5. Positive straight leg raise on both sides. More pain reported with right leg raise but unable to achieve 15 degrees with left leg. Right leg lifted to approximately 20 degrees.     Results for orders placed or performed in visit on 11/12/20   XR LUMBAR SPINE 2/3 VIEWS (Clinic Performed)     Status: None    Narrative    PROCEDURE: XR LUMBAR SPINE 2-3 VIEWS 11/12/2020 8:22 AM    HISTORY: Lumbar back pain    COMPARISONS: None.    TECHNIQUE: AP and lateral    FINDINGS: The lumbar disks are normal in height. The vertebral bodies  and arches are intact. Sacrum and sacroiliac joints appear normal.         Impression    IMPRESSION: Normal lumbar spine    ENEDINA ALEJANDRO MD           Assessment & Plan   1. Lumbar back pain  We have discussed a multipronged approach to patient's back pain.   Acute flare (week or two)  Take ibuprofen on a scheduled bases. Take frequent breaks to rest and stretch gently. Use heat and ice as tolerated and for comfort. Flexeril can help with spasms. You may find it most helpful at night when back pain can make sleeping difficult.   Post acute Flare  I recommend you start physical therapy for both strengthening and to learn tips for improved body mechanics. A referral has been placed.  Optimize weight: If you are above your target weight goal, I encourage you to work towards a 5% weight loss over the next 3 months to start. I recommend using a food and drink log to better understand your current intake trends. A reduction in calories us usually " "needed to lose weight vs burning calories through exercise. Start any exercise slowly and make plan to gradually increase activity levels.     - XR LUMBAR SPINE 2/3 VIEWS (Clinic Performed)  - cyclobenzaprine (FLEXERIL) 10 MG tablet; Take 1 tablet (10 mg) by mouth 3 times daily as needed for muscle spasms  Dispense: 60 tablet; Refill: 0  - COMPREHENSIVE WEIGHT MANAGEMENT  - ibuprofen (ADVIL/MOTRIN) 800 MG tablet; Take 1 tablet (800 mg) by mouth every 8 hours as needed for pain  Dispense: 90 tablet; Refill: 0  - PHYSICAL THERAPY REFERRAL; Future    2. Morbid obesity (H)  - COMPREHENSIVE WEIGHT MANAGEMENT       BMI:   Estimated body mass index is 51.98 kg/m  as calculated from the following:    Height as of this encounter: 1.753 m (5' 9\").    Weight as of this encounter: 159.7 kg (352 lb).   Weight management plan: Patient referred to endocrine and/or weight management specialty        No follow-ups on file.    Michelle Oliva, CNP  [facility] [place] [facility]    "

## 2020-11-12 NOTE — NURSING NOTE
"Chief Complaint   Patient presents with     Back Pain       Initial /88 (BP Location: Right arm, Patient Position: Chair, Cuff Size: Adult Large)   Pulse 89   Temp 98.8  F (37.1  C) (Temporal)   Ht 1.753 m (5' 9\")   Wt (!) 159.7 kg (352 lb)   SpO2 95%   BMI 51.98 kg/m   Estimated body mass index is 51.98 kg/m  as calculated from the following:    Height as of this encounter: 1.753 m (5' 9\").    Weight as of this encounter: 159.7 kg (352 lb).  Medication Reconciliation: complete  Angeles Garcias LPN    "

## 2020-11-23 ENCOUNTER — TELEPHONE (OUTPATIENT)
Dept: FAMILY MEDICINE | Facility: OTHER | Age: 35
End: 2020-11-23

## 2020-12-14 ENCOUNTER — HEALTH MAINTENANCE LETTER (OUTPATIENT)
Age: 35
End: 2020-12-14

## 2021-02-15 ENCOUNTER — VIRTUAL VISIT (OUTPATIENT)
Dept: FAMILY MEDICINE | Facility: OTHER | Age: 36
End: 2021-02-15
Attending: NURSE PRACTITIONER
Payer: COMMERCIAL

## 2021-02-15 DIAGNOSIS — K08.9 DENTAL DISORDER: Primary | ICD-10-CM

## 2021-02-15 PROCEDURE — 99213 OFFICE O/P EST LOW 20 MIN: CPT | Mod: TEL | Performed by: NURSE PRACTITIONER

## 2021-02-15 RX ORDER — IBUPROFEN 800 MG/1
800 TABLET, FILM COATED ORAL EVERY 8 HOURS PRN
Qty: 90 TABLET | Refills: 0 | Status: SHIPPED | OUTPATIENT
Start: 2021-02-15 | End: 2022-04-15

## 2021-02-15 RX ORDER — PENICILLIN V POTASSIUM 500 MG/1
500 TABLET, FILM COATED ORAL 3 TIMES DAILY
Qty: 30 TABLET | Refills: 0 | Status: SHIPPED | OUTPATIENT
Start: 2021-02-15 | End: 2021-02-25

## 2021-02-15 NOTE — PATIENT INSTRUCTIONS
Assessment & Plan       Dental disorder  - penicillin V (VEETID) 500 MG tablet; Take 1 tablet (500 mg) by mouth 3 times daily for 10 days  - Ibuprofen PRN  - Dental visit when it can be arranged      Emely Perez CNP  [facility] [place] [facility]

## 2021-02-15 NOTE — PROGRESS NOTES
Nhan is a 36 year old who is being evaluated via a billable telephone visit.      What phone number would you like to be contacted at? [phone]  How would you like to obtain your AVS?       Assessment & Plan     Dental disorder  - penicillin V (VEETID) 500 MG tablet; Take 1 tablet (500 mg) by mouth 3 times daily for 10 days  - Ibuprofen PRN  - Dental visit when it can be arranged    Emely Perez CNP  [facility] Jusitn is a 36 year old who presents for the following health issues       Concern - Dental issue  Onset: 3- days ago  Description: left lower jaw  Intensity: moderate, severe  Progression of Symptoms:  worsening  Accompanying Signs & Symptoms: swelling of jaw, pain while eating/talking  Previous history of similar problem: yes  Precipitating factors:        Worsened by: eating, talking, moving jaw  Alleviating factors:        Improved by: none  Therapies tried and outcome: IBU, tylenol         Patient Active Problem List   Diagnosis     ACP (advance care planning)     Morbid obesity (H)     Past Surgical History:   Procedure Laterality Date     CIRCUMCISION       COLONOSCOPY N/A 4/11/2016    Procedure: COLONOSCOPY;  Surgeon: Marely Hunt MD;  Location: [facility]     TONSILLECTOMY       wisdom teeth extracted         Social History     Tobacco Use     Smoking status: Former Smoker     Packs/day: 0.50     Years: 13.00     Pack years: 6.50     Types: Cigarettes     Smokeless tobacco: Never Used     Tobacco comment: tried to quit - longest period tobacco free: 2 years - no passive smoke exposure   Substance Use Topics     Alcohol use: Yes     Alcohol/week: 0.0 standard drinks     Comment: occasion     Family History   Problem Relation Age of Onset     C.A.D. Other         family h/o     Cerebrovascular Disease Other         CVA - family h/o           No current outpatient medications on file.     Allergies   Allergen Reactions     Azithromycin Other (See Comments) and Hives      Zithromax - urticaria     Diphenhydramine Hcl Hives     Benadryl     Dust Mites      Recent Labs   Lab Test 10/14/16  1719 03/21/16  1608   ALT 70 88*   CR 0.98 0.94   GFRESTIMATED >90  Non  GFR Calc   >90  Non  GFR Calc     GFRESTBLACK >90   GFR Calc   >90   GFR Calc     POTASSIUM 4.3 4.1   TSH 3.48 6.96*        BP Readings from Last 3 Encounters:   11/12/20 128/88   01/21/19 118/78   06/26/17 126/78    Wt Readings from Last 3 Encounters:   11/12/20 (!) 159.7 kg (352 lb)   08/31/20 147.4 kg (325 lb)   01/21/19 104.3 kg (230 lb)               Review of Systems   Constitutional, HEENT, cardiovascular, pulmonary, gi and gu systems are negative, except as otherwise noted.        Objective       Vitals:  No vitals were obtained today due to virtual visit.  Physical Exam   healthy, alert and no distress  PSYCH: Alert and oriented times 3; coherent speech, normal   rate and volume, able to articulate logical thoughts, able   to abstract reason, no tangential thoughts, no hallucinations   or delusions  His affect is normal  RESP: No cough, no audible wheezing, able to talk in full sentences  Remainder of exam unable to be completed due to telephone visits            Phone call duration: 13 minutes

## 2021-04-08 ENCOUNTER — TRANSFERRED RECORDS (OUTPATIENT)
Dept: HEALTH INFORMATION MANAGEMENT | Facility: CLINIC | Age: 36
End: 2021-04-08

## 2021-04-08 DIAGNOSIS — Z20.822 EXPOSURE TO 2019 NOVEL CORONAVIRUS: Primary | ICD-10-CM

## 2021-04-15 ENCOUNTER — NURSE TRIAGE (OUTPATIENT)
Dept: FAMILY MEDICINE | Facility: OTHER | Age: 36
End: 2021-04-15

## 2021-04-15 ENCOUNTER — TELEPHONE (OUTPATIENT)
Dept: FAMILY MEDICINE | Facility: OTHER | Age: 36
End: 2021-04-15

## 2021-04-15 DIAGNOSIS — Z20.822 SUSPECTED COVID-19 VIRUS INFECTION: Primary | ICD-10-CM

## 2021-04-15 NOTE — TELEPHONE ENCOUNTER
Reason for call:  REFERRAL   1. Concern: Rapid Covid Test   2. Have you seen a provider for this concern? No  3. Who?   4. When?   5. What services are you requesting? Referral for a rapid covid test at [facility]  Description: Patient wants a rapid covid test and they need a referral to the [facility] to get that done sent by his primary care provider. Last Thursday he had negative covid test done at [facility] on 4/8/2021 and his wife had a positive covid result and in the last two days he has had a fever and his work wants him to have another test done in order to go back to work and the wife is requesting going to [facility] since they are only ones that do a rapid covid test.  Was an appointment offered for this a call? No  If Yes:  Appointment type                Date    Preferred method for responding to this message: Telephone Call  What is your phone number ? [phone]    If we cannot reach you directly, may we leave a detailed response at the number you provided? Yes  Can this message wait until your PCP/Provider returns if not available today? YES

## 2021-04-15 NOTE — TELEPHONE ENCOUNTER
Patient's wife called due to patient having fevers, chills, and headache. Wife tested positive last week and now patient is experiencing symptoms. Limited information obtained due to patient not being with wife. Patient scheduled for COVID swab and given home care and isolation information. Nurse advised wife if patient experiences worsening of symptoms patient could call clinic back or be seen in UC/ED. Wife verbalized understanding.    Reason for Disposition    [1] COVID-19 infection suspected by caller or triager AND [2] mild symptoms (cough, fever, or others) AND [3] no complications or SOB    Additional Information    Negative: SEVERE difficulty breathing (e.g., struggling for each breath, speaks in single words)    Negative: Difficult to awaken or acting confused (e.g., disoriented, slurred speech)    Negative: Bluish (or gray) lips or face now    Negative: Shock suspected (e.g., cold/pale/clammy skin, too weak to stand, low BP, rapid pulse)    Negative: Sounds like a life-threatening emergency to the triager    Negative: [1] COVID-19 exposure AND [2] no symptoms    Negative: [1] Lives with someone known to have influenza (flu test positive) AND [2] flu-like symptoms (e.g., cough, runny nose, sore throat, SOB; with or without fever)    Negative: [1] Adult with possible COVID-19 symptoms AND [2] triager concerned about severity of symptoms or other causes    Negative: COVID-19 vaccine reaction suspected (e.g., fever, headache, muscle aches) occurring during days 1-3 after getting vaccine    Negative: COVID-19 vaccine, questions about    Negative: COVID-19 and breastfeeding, questions about    Negative: SEVERE or constant chest pain or pressure (Exception: mild central chest pain, present only when coughing)    Negative: MODERATE difficulty breathing (e.g., speaks in phrases, SOB even at rest, pulse 100-120)    Negative: [1] Headache AND [2] stiff neck (can't touch chin to chest)    Negative: MILD difficulty  "breathing (e.g., minimal/no SOB at rest, SOB with walking, pulse <100)    Negative: Chest pain or pressure    Negative: Patient sounds very sick or weak to the triager    Negative: Fever > 103 F (39.4 C)    Negative: [1] Fever > 101 F (38.3 C) AND [2] age > 60    Negative: [1] Fever > 100.0 F (37.8 C) AND [2] bedridden (e.g., nursing home patient, CVA, chronic illness, recovering from surgery)    Negative: [1] HIGH RISK patient (e.g., age > 64 years, diabetes, heart or lung disease, weak immune system) AND [2] new or worsening symptoms    Negative: [1] HIGH RISK patient AND [2] influenza is widespread in the community AND [3] ONE OR MORE respiratory symptoms: cough, sore throat, runny or stuffy nose    Negative: [1] HIGH RISK patient AND [2] influenza exposure within the last 7 days AND [3] ONE OR MORE respiratory symptoms: cough, sore throat, runny or stuffy nose    Negative: Fever present > 3 days (72 hours)    Negative: [1] Fever returns after gone for over 24 hours AND [2] symptoms worse or not improved    Negative: [1] Continuous (nonstop) coughing interferes with work or school AND [2] no improvement using cough treatment per protocol    Answer Assessment - Initial Assessment Questions  1. COVID-19 DIAGNOSIS: \"Who made your Coronavirus (COVID-19) diagnosis?\" \"Was it confirmed by a positive lab test?\" If not diagnosed by a HCP, ask \"Are there lots of cases (community spread) where you live?\" (See public health department website, if unsure)      No diagnosis  2. COVID-19 EXPOSURE: \"Was there any known exposure to COVID before the symptoms began?\" CDC Definition of close contact: within 6 feet (2 meters) for a total of 15 minutes or more over a 24-hour period.       Exposed to wife  3. ONSET: \"When did the COVID-19 symptoms start?\"       Two days ago  4. WORST SYMPTOM: \"What is your worst symptom?\" (e.g., cough, fever, shortness of breath, muscle aches)      fever  5. COUGH: \"Do you have a cough?\" If so, ask: " "\"How bad is the cough?\"        no  6. FEVER: \"Do you have a fever?\" If so, ask: \"What is your temperature, how was it measured, and when did it start?\"      Yes, highest temp is 102  7. RESPIRATORY STATUS: \"Describe your breathing?\" (e.g., shortness of breath, wheezing, unable to speak)       no  8. BETTER-SAME-WORSE: \"Are you getting better, staying the same or getting worse compared to yesterday?\"  If getting worse, ask, \"In what way?\"      same  9. HIGH RISK DISEASE: \"Do you have any chronic medical problems?\" (e.g., asthma, heart or lung disease, weak immune system, obesity, etc.)      no  10. PREGNANCY: \"Is there any chance you are pregnant?\" \"When was your last menstrual period?\"        n/a  11. OTHER SYMPTOMS: \"Do you have any other symptoms?\"  (e.g., chills, fatigue, headache, loss of smell or taste, muscle pain, sore throat; new loss of smell or taste especially support the diagnosis of COVID-19)        Chills and headache    Protocols used: CORONAVIRUS (COVID-19) DIAGNOSED OR [id number] 1.3      "

## 2021-04-15 NOTE — TELEPHONE ENCOUNTER
Left message for return call.  I spoke with [facility] and they do not do rapid testing according to the nurse at the facility. Pt could have another order sent to the [facility] or he could pay out of pocket for a test at [facility].

## 2021-04-16 ENCOUNTER — OFFICE VISIT (OUTPATIENT)
Dept: FAMILY MEDICINE | Facility: OTHER | Age: 36
End: 2021-04-16
Attending: NURSE PRACTITIONER
Payer: COMMERCIAL

## 2021-04-16 DIAGNOSIS — Z20.822 SUSPECTED COVID-19 VIRUS INFECTION: ICD-10-CM

## 2021-04-16 LAB
SARS-COV-2 RNA RESP QL NAA+PROBE: NORMAL
SPECIMEN SOURCE: NORMAL

## 2021-04-16 PROCEDURE — U0005 INFEC AGEN DETEC AMPLI PROBE: HCPCS | Performed by: FAMILY MEDICINE

## 2021-04-16 PROCEDURE — U0003 INFECTIOUS AGENT DETECTION BY NUCLEIC ACID (DNA OR RNA); SEVERE ACUTE RESPIRATORY SYNDROME CORONAVIRUS 2 (SARS-COV-2) (CORONAVIRUS DISEASE [COVID-19]), AMPLIFIED PROBE TECHNIQUE, MAKING USE OF HIGH THROUGHPUT TECHNOLOGIES AS DESCRIBED BY CMS-2020-01-R: HCPCS | Performed by: FAMILY MEDICINE

## 2021-04-17 ENCOUNTER — TELEPHONE (OUTPATIENT)
Dept: FAMILY MEDICINE | Facility: OTHER | Age: 36
End: 2021-04-17

## 2021-04-17 LAB
LABORATORY COMMENT REPORT: ABNORMAL
SARS-COV-2 RNA RESP QL NAA+PROBE: POSITIVE
SPECIMEN SOURCE: ABNORMAL

## 2021-04-17 NOTE — TELEPHONE ENCOUNTER
Coronavirus (COVID-19) Notification    Reason for call  Notify of POSITIVE  COVID-19 lab result, assess symptoms,  review [facility] recommendations    Lab Result   Lab test for 2019-nCoV rRt-PCR or SARS-COV-2 PCR  Oropharyngeal AND/OR nasopharyngeal swabs were POSITIVE for 2019-nCoV RNA [OR] SARS-COV-2 RNA (COVID-19) RNA     We have been unable to reach Patient by phone at this time to notify of their Positive COVID-19 result.  Left voicemail message requesting a call back to [phone] [facility] for results.        POSITIVE COVID-19 Letter sent.    Miranda Wright

## 2021-04-18 ENCOUNTER — HEALTH MAINTENANCE LETTER (OUTPATIENT)
Age: 36
End: 2021-04-18

## 2021-10-02 ENCOUNTER — HEALTH MAINTENANCE LETTER (OUTPATIENT)
Age: 36
End: 2021-10-02

## 2021-12-07 ENCOUNTER — OFFICE VISIT (OUTPATIENT)
Dept: FAMILY MEDICINE | Facility: OTHER | Age: 36
End: 2021-12-07
Attending: NURSE PRACTITIONER
Payer: COMMERCIAL

## 2021-12-07 VITALS
TEMPERATURE: 98.6 F | DIASTOLIC BLOOD PRESSURE: 88 MMHG | RESPIRATION RATE: 20 BRPM | SYSTOLIC BLOOD PRESSURE: 130 MMHG | WEIGHT: 315 LBS | HEART RATE: 103 BPM | BODY MASS INDEX: 51.83 KG/M2 | OXYGEN SATURATION: 97 %

## 2021-12-07 DIAGNOSIS — I10 PRIMARY HYPERTENSION: ICD-10-CM

## 2021-12-07 DIAGNOSIS — F41.9 ANXIETY: ICD-10-CM

## 2021-12-07 LAB — HOLD SPECIMEN: NORMAL

## 2021-12-07 PROCEDURE — 80053 COMPREHEN METABOLIC PANEL: CPT | Performed by: NURSE PRACTITIONER

## 2021-12-07 PROCEDURE — 99214 OFFICE O/P EST MOD 30 MIN: CPT | Performed by: NURSE PRACTITIONER

## 2021-12-07 PROCEDURE — 36415 COLL VENOUS BLD VENIPUNCTURE: CPT | Performed by: NURSE PRACTITIONER

## 2021-12-07 PROCEDURE — 84439 ASSAY OF FREE THYROXINE: CPT | Performed by: NURSE PRACTITIONER

## 2021-12-07 PROCEDURE — 84443 ASSAY THYROID STIM HORMONE: CPT | Performed by: NURSE PRACTITIONER

## 2021-12-07 PROCEDURE — 80061 LIPID PANEL: CPT | Performed by: NURSE PRACTITIONER

## 2021-12-07 RX ORDER — ESCITALOPRAM OXALATE 10 MG/1
10 TABLET ORAL DAILY
Qty: 90 TABLET | Refills: 1 | Status: SHIPPED | OUTPATIENT
Start: 2021-12-07 | End: 2022-06-08

## 2021-12-07 RX ORDER — METOPROLOL SUCCINATE 50 MG/1
50 TABLET, EXTENDED RELEASE ORAL DAILY
Qty: 90 TABLET | Refills: 1 | Status: SHIPPED | OUTPATIENT
Start: 2021-12-07 | End: 2022-11-03

## 2021-12-07 ASSESSMENT — ANXIETY QUESTIONNAIRES
1. FEELING NERVOUS, ANXIOUS, OR ON EDGE: NEARLY EVERY DAY
6. BECOMING EASILY ANNOYED OR IRRITABLE: NEARLY EVERY DAY
7. FEELING AFRAID AS IF SOMETHING AWFUL MIGHT HAPPEN: NEARLY EVERY DAY
GAD7 TOTAL SCORE: 15
3. WORRYING TOO MUCH ABOUT DIFFERENT THINGS: MORE THAN HALF THE DAYS
IF YOU CHECKED OFF ANY PROBLEMS ON THIS QUESTIONNAIRE, HOW DIFFICULT HAVE THESE PROBLEMS MADE IT FOR YOU TO DO YOUR WORK, TAKE CARE OF THINGS AT HOME, OR GET ALONG WITH OTHER PEOPLE: SOMEWHAT DIFFICULT
2. NOT BEING ABLE TO STOP OR CONTROL WORRYING: MORE THAN HALF THE DAYS
5. BEING SO RESTLESS THAT IT IS HARD TO SIT STILL: NOT AT ALL

## 2021-12-07 ASSESSMENT — PAIN SCALES - GENERAL: PAINLEVEL: NO PAIN (0)

## 2021-12-07 ASSESSMENT — PATIENT HEALTH QUESTIONNAIRE - PHQ9: 5. POOR APPETITE OR OVEREATING: MORE THAN HALF THE DAYS

## 2021-12-07 NOTE — NURSING NOTE
"Chief Complaint   Patient presents with     Hypertension       Initial /88 (BP Location: Right arm, Patient Position: Sitting, Cuff Size: Adult Large)   Pulse 103   Temp 98.6  F (37  C) (Tympanic)   Resp 20   Wt (!) 159.2 kg (351 lb)   SpO2 97%   BMI 51.83 kg/m   Estimated body mass index is 51.83 kg/m  as calculated from the following:    Height as of 11/12/20: 1.753 m (5' 9\").    Weight as of this encounter: 159.2 kg (351 lb).  Medication Reconciliation: complete  Leslie Del Valle LPN  "

## 2021-12-07 NOTE — PROGRESS NOTES
Assessment & Plan        Primary hypertension  - TSH with free T4 reflex; Future  - Lipid Profile (Chol, Trig, HDL, LDL calc); Future  - Comprehensive metabolic panel; Future  - metoprolol succinate ER (TOPROL-XL) 50 MG 24 hr tablet; Take 1 tablet (50 mg) by mouth daily      Anxiety  - escitalopram (LEXAPRO) 10 MG tablet; Take 1 tablet (10 mg) by mouth daily      Multiple vitamin daily  D3 5000 U daily      Return in about 4 weeks (around 1/4/2022).      Emely Perez CNP  [facility] - KAE Justin is a 36 year old who presents for the following health issues       Hypertension     Do you check your blood pressure regularly outside of the clinic? Yes     Are you following a low salt diet? Yes    Are your blood pressures ever more than 140 on the top number (systolic) OR more   than 90 on the bottom number (diastolic), for example 140/90? Yes        Has been having high readings at home  Getting frequent headaches        Anxiety     How are you doing with your anxiety since your last visit? Worsened     Are you having other symptoms that might be associated with anxiety? No    Have you had a significant life event? No     Are you feeling depressed? No    Do you have any concerns with your use of alcohol or other drugs? No         Social History     Tobacco Use     Smoking status: Former Smoker     Packs/day: 0.50     Years: 13.00     Pack years: 6.50     Types: Cigarettes     Smokeless tobacco: Never Used     Tobacco comment: tried to quit - longest period tobacco free: 2 years - no passive smoke exposure   Substance Use Topics     Alcohol use: Yes     Alcohol/week: 0.0 standard drinks     Comment: occasion     Drug use: No         AL-7 SCORE 1/21/2019 8/31/2020 12/7/2021   Total Score 3 2 15         PHQ 1/21/2019 8/31/2020 12/7/2021   PHQ-9 Total Score 1 3 4   Q9: Thoughts of better off dead/self-harm past 2 weeks Not at all Not at all Not at all         Review of Systems   Constitutional,  HEENT, cardiovascular, pulmonary, GI, , musculoskeletal, neuro, skin, endocrine and psych systems are negative, except as otherwise noted.        Objective    /88 (BP Location: Right arm, Patient Position: Sitting, Cuff Size: Adult Large)   Pulse 103   Temp 98.6  F (37  C) (Tympanic)   Resp 20   Wt (!) 159.2 kg (351 lb)   SpO2 97%   BMI 51.83 kg/m    Body mass index is 51.83 kg/m .       Physical Exam   GENERAL: healthy, alert and no distress  EYES: Eyes grossly normal to inspection, PERRL and conjunctivae and sclerae normal  HENT: ear canals and TM's normal, nose and mouth without ulcers or lesions  NECK: no adenopathy, no asymmetry, masses, or scars and thyroid normal to palpation  RESP: lungs clear to auscultation - no rales, rhonchi or wheezes  CV: regular rate and rhythm, normal S1 S2, no S3 or S4, no murmur, click or rub, no peripheral edema and peripheral pulses strong  SKIN: no suspicious lesions or rashes  PSYCH: anxious

## 2021-12-07 NOTE — PATIENT INSTRUCTIONS
Assessment & Plan        Primary hypertension - Goal 130's/ 70's  - TSH with free T4 reflex; Future  - Lipid Profile (Chol, Trig, HDL, LDL calc); Future  - Comprehensive metabolic panel; Future  - metoprolol succinate ER (TOPROL-XL) 50 MG 24 hr tablet; Take 1 tablet (50 mg) by mouth daily      Anxiety  - escitalopram (LEXAPRO) 10 MG tablet; Take 1 tablet (10 mg) by mouth daily      Multiple vitamin daily  D3 5000 U daily      Return in about 4 weeks (around 1/4/2022).      Emely Perez, JUSTEN  [facility]

## 2021-12-08 LAB
ALBUMIN SERPL-MCNC: 3.7 G/DL (ref 3.4–5)
ALP SERPL-CCNC: 60 U/L (ref 40–150)
ALT SERPL W P-5'-P-CCNC: 49 U/L (ref 0–70)
ANION GAP SERPL CALCULATED.3IONS-SCNC: 8 MMOL/L (ref 3–14)
AST SERPL W P-5'-P-CCNC: 28 U/L (ref 0–45)
BILIRUB SERPL-MCNC: 0.5 MG/DL (ref 0.2–1.3)
BUN SERPL-MCNC: 15 MG/DL (ref 7–30)
CALCIUM SERPL-MCNC: 9.2 MG/DL (ref 8.5–10.1)
CHLORIDE BLD-SCNC: 109 MMOL/L (ref 94–109)
CHOLEST SERPL-MCNC: 148 MG/DL
CO2 SERPL-SCNC: 24 MMOL/L (ref 20–32)
CREAT SERPL-MCNC: 1.03 MG/DL (ref 0.66–1.25)
FASTING STATUS PATIENT QL REPORTED: NO
GFR SERPL CREATININE-BSD FRML MDRD: >90 ML/MIN/1.73M2
GLUCOSE BLD-MCNC: 86 MG/DL (ref 70–99)
HDLC SERPL-MCNC: 32 MG/DL
LDLC SERPL CALC-MCNC: 88 MG/DL
NONHDLC SERPL-MCNC: 116 MG/DL
POTASSIUM BLD-SCNC: 4.1 MMOL/L (ref 3.4–5.3)
PROT SERPL-MCNC: 8 G/DL (ref 6.8–8.8)
SODIUM SERPL-SCNC: 141 MMOL/L (ref 133–144)
T4 FREE SERPL-MCNC: 1.07 NG/DL (ref 0.76–1.46)
TRIGL SERPL-MCNC: 141 MG/DL
TSH SERPL DL<=0.005 MIU/L-ACNC: 4.42 MU/L (ref 0.4–4)

## 2021-12-08 ASSESSMENT — ANXIETY QUESTIONNAIRES: GAD7 TOTAL SCORE: 15

## 2021-12-08 ASSESSMENT — PATIENT HEALTH QUESTIONNAIRE - PHQ9: SUM OF ALL RESPONSES TO PHQ QUESTIONS 1-9: 4

## 2022-04-13 ENCOUNTER — TELEPHONE (OUTPATIENT)
Dept: FAMILY MEDICINE | Facility: OTHER | Age: 37
End: 2022-04-13
Payer: COMMERCIAL

## 2022-04-13 NOTE — TELEPHONE ENCOUNTER
Please call patient. Emely can see him at [address] on Friday. May see another provider if something earlier available.

## 2022-04-13 NOTE — TELEPHONE ENCOUNTER
Attempt # 1  Outcome: Left Message   Comment: lvm for pt to call and schedule the appt at 915 with jesus manuel queen on the 15th

## 2022-04-13 NOTE — TELEPHONE ENCOUNTER
8:30 AM    Reason for Call: OVERBOOK    Patient is having the following symptoms: back pain for 1 months.    The patient is requesting an appointment for ASAP with any provider.    Was an appointment offered for this call? No  If yes : Appointment type              Date    Preferred method for responding to this message: Telephone Call  What is your phone number ? [phone]    If we cannot reach you directly, may we leave a detailed response at the number you provided? Yes    Can this message wait until your PCP/provider returns, if unavailable today? Not applicable, provider is in today     Pat Salinas

## 2022-04-14 NOTE — PROGRESS NOTES
Assessment & Plan        Lumbar back pain with radiculopathy affecting left lower extremity  - cyclobenzaprine (FLEXERIL) 10 MG tablet; Take 1 tablet (10 mg) by mouth 2 times daily as needed for muscle spasms  - ibuprofen (ADVIL/MOTRIN) 800 MG tablet; Take 1 tablet (800 mg) by mouth every 8 hours as needed for moderate pain  - acetaminophen-codeine (TYLENOL #3) 300-30 MG tablet; 1-2 po BID PRN  - Physical Therapy Referral; Future  - Ice  - Voltaren Gel OTC      Primary hypertension  - TSH with free T4 reflex; Future  - Please take your Toprol as directed      GERD  - Omeprazole as directed      Return in about 6 months (around 10/15/2022).        Emely Perez, CNP  [facility] - KAE Justin is a 37 year old who presents for the following health issues       Pain History:  When did you first notice your pain? - 1 to 6 weeks (exactly one month ago today)  Have you seen any provider previously for this issue? Yes - Maria Antonia  How has your pain affected your ability to work? Unable to work due to pain   What type of work do you or did you do?   Where in your body do you have pain? Back/hip/groin area       Radiation of pain to RLE      Hypertension Follow-up  Not taking his Toprol XL    Do you check your blood pressure regularly outside of the clinic? No     Are you following a low salt diet? No    Are your blood pressures ever more than 140 on the top number (systolic) OR more   than 90 on the bottom number (diastolic), for example 140/90? No      Patient Active Problem List   Diagnosis     ACP (advance care planning)     Morbid obesity (H)     Primary hypertension     Anxiety     Past Surgical History:   Procedure Laterality Date     CIRCUMCISION       COLONOSCOPY N/A 4/11/2016    Procedure: COLONOSCOPY;  Surgeon: Marely Hunt MD;  Location: [facility] OR     TONSILLECTOMY       wisdom teeth extracted         Social History     Tobacco Use     Smoking status: Former Smoker      Packs/day: 0.50     Years: 13.00     Pack years: 6.50     Types: Cigarettes     Smokeless tobacco: Never Used     Tobacco comment: tried to quit - longest period tobacco free: 2 years - no passive smoke exposure   Substance Use Topics     Alcohol use: Yes     Alcohol/week: 0.0 standard drinks     Comment: occasion     Family History   Problem Relation Age of Onset     C.A.D. Other         family h/o     Cerebrovascular Disease Other         CVA - family h/o             Current Outpatient Medications   Medication Sig Dispense Refill     acetaminophen-codeine (TYLENOL #3) 300-30 MG tablet 1-2 po BID PRN 40 tablet 0     cyclobenzaprine (FLEXERIL) 10 MG tablet Take 1 tablet (10 mg) by mouth 2 times daily as needed for muscle spasms 60 tablet 1     escitalopram (LEXAPRO) 10 MG tablet Take 1 tablet (10 mg) by mouth daily 90 tablet 1     ibuprofen (ADVIL/MOTRIN) 800 MG tablet Take 1 tablet (800 mg) by mouth every 8 hours as needed for moderate pain 90 tablet 1     metoprolol succinate ER (TOPROL-XL) 50 MG 24 hr tablet Take 1 tablet (50 mg) by mouth daily (Patient not taking: Reported on 4/15/2022) 90 tablet 1         Allergies   Allergen Reactions     Azithromycin Other (See Comments) and Hives     Zithromax - urticaria     Diphenhydramine Hcl Hives     Benadryl     Dust Mites          Recent Labs   Lab Test 12/07/21  1618 10/14/16  1719 03/21/16  1608   LDL 88  --   --    HDL 32*  --   --    TRIG 141  --   --    ALT 49 70 88*   CR 1.03 0.98 0.94   GFRESTIMATED >90 >90  Non  GFR Calc   >90  Non  GFR Calc     GFRESTBLACK  --  >90   GFR Calc   >90   GFR Calc     POTASSIUM 4.1 4.3 4.1   TSH 4.42* 3.48 6.96*          BP Readings from Last 3 Encounters:   04/15/22 134/88   12/07/21 130/88   11/12/20 128/88    Wt Readings from Last 3 Encounters:   04/15/22 (!) 151.5 kg (333 lb 14.4 oz)   12/07/21 (!) 159.2 kg (351 lb)   11/12/20 (!) 159.7 kg (352 lb)                Review of Systems     Constitutional, HEENT, cardiovascular, pulmonary, gi and gu systems are negative, except as otherwise noted.          Objective    /88 (BP Location: Left arm, Patient Position: Chair, Cuff Size: Adult Large)   Pulse 95   Temp 99  F (37.2  C) (Tympanic)   Resp 20   Wt (!) 151.5 kg (333 lb 14.4 oz)   SpO2 94%   BMI 49.31 kg/m    Body mass index is 49.31 kg/m .         Physical Exam   GENERAL: healthy, alert and no distress  EYES: Eyes grossly normal to inspection, PERRL and conjunctivae and sclerae normal  NECK: no adenopathy, no asymmetry, masses, or scars and thyroid normal to palpation  RESP: lungs clear to auscultation - no rales, rhonchi or wheezes  CV: regular rate and rhythm, normal S1 S2, no S3 or S4, no murmur, click or rub, no peripheral edema and peripheral pulses strong  MS: Lumbar pain, Left side - radiates to left leg.  Lumbar stiffness, altered gait  SKIN: no suspicious lesions or rashes  PSYCH: mentation appears normal, affect normal/bright

## 2022-04-15 ENCOUNTER — OFFICE VISIT (OUTPATIENT)
Dept: FAMILY MEDICINE | Facility: OTHER | Age: 37
End: 2022-04-15
Attending: NURSE PRACTITIONER
Payer: COMMERCIAL

## 2022-04-15 VITALS
TEMPERATURE: 99 F | OXYGEN SATURATION: 94 % | RESPIRATION RATE: 20 BRPM | HEART RATE: 95 BPM | WEIGHT: 315 LBS | BODY MASS INDEX: 49.31 KG/M2 | DIASTOLIC BLOOD PRESSURE: 88 MMHG | SYSTOLIC BLOOD PRESSURE: 134 MMHG

## 2022-04-15 DIAGNOSIS — M54.16 LUMBAR BACK PAIN WITH RADICULOPATHY AFFECTING LEFT LOWER EXTREMITY: Primary | ICD-10-CM

## 2022-04-15 DIAGNOSIS — K21.00 GASTROESOPHAGEAL REFLUX DISEASE WITH ESOPHAGITIS WITHOUT HEMORRHAGE: ICD-10-CM

## 2022-04-15 DIAGNOSIS — I10 PRIMARY HYPERTENSION: ICD-10-CM

## 2022-04-15 LAB — TSH SERPL DL<=0.005 MIU/L-ACNC: 3.75 MU/L (ref 0.4–4)

## 2022-04-15 PROCEDURE — 84443 ASSAY THYROID STIM HORMONE: CPT | Performed by: NURSE PRACTITIONER

## 2022-04-15 PROCEDURE — 36415 COLL VENOUS BLD VENIPUNCTURE: CPT | Performed by: NURSE PRACTITIONER

## 2022-04-15 PROCEDURE — 99214 OFFICE O/P EST MOD 30 MIN: CPT | Performed by: NURSE PRACTITIONER

## 2022-04-15 RX ORDER — CYCLOBENZAPRINE HCL 10 MG
10 TABLET ORAL 2 TIMES DAILY PRN
Qty: 60 TABLET | Refills: 1 | Status: SHIPPED | OUTPATIENT
Start: 2022-04-15 | End: 2022-10-19

## 2022-04-15 RX ORDER — OMEPRAZOLE 40 MG/1
40 CAPSULE, DELAYED RELEASE ORAL DAILY
Qty: 90 CAPSULE | Refills: 1 | Status: SHIPPED | OUTPATIENT
Start: 2022-04-15 | End: 2022-11-03

## 2022-04-15 RX ORDER — ESCITALOPRAM OXALATE 10 MG/1
1 TABLET ORAL
COMMUNITY
End: 2022-04-15

## 2022-04-15 RX ORDER — BACLOFEN 20 MG/1
TABLET ORAL
COMMUNITY
Start: 2022-03-29 | End: 2022-04-15

## 2022-04-15 RX ORDER — IBUPROFEN 800 MG/1
800 TABLET, FILM COATED ORAL EVERY 8 HOURS PRN
Qty: 90 TABLET | Refills: 1 | Status: SHIPPED | OUTPATIENT
Start: 2022-04-15 | End: 2024-02-07

## 2022-04-15 ASSESSMENT — PAIN SCALES - GENERAL: PAINLEVEL: MODERATE PAIN (5)

## 2022-04-15 NOTE — NURSING NOTE
"Chief Complaint   Patient presents with     Back Pain       Initial /88 (BP Location: Left arm, Patient Position: Chair, Cuff Size: Adult Large)   Pulse 95   Temp 99  F (37.2  C) (Tympanic)   Resp 20   Wt (!) 151.5 kg (333 lb 14.4 oz)   SpO2 94%   BMI 49.31 kg/m   Estimated body mass index is 49.31 kg/m  as calculated from the following:    Height as of 11/12/20: 1.753 m (5' 9\").    Weight as of this encounter: 151.5 kg (333 lb 14.4 oz).  Medication Reconciliation: complete  Natalee Cerda    "

## 2022-04-15 NOTE — PATIENT INSTRUCTIONS
Assessment & Plan        Lumbar back pain with radiculopathy affecting left lower extremity  - cyclobenzaprine (FLEXERIL) 10 MG tablet; Take 1 tablet (10 mg) by mouth 2 times daily as needed for muscle spasms  - ibuprofen (ADVIL/MOTRIN) 800 MG tablet; Take 1 tablet (800 mg) by mouth every 8 hours as needed for moderate pain  - acetaminophen-codeine (TYLENOL #3) 300-30 MG tablet; 1-2 po BID PRN  - Physical Therapy Referral; Future  - Ice  - Voltaren Gel OTC        Primary hypertension  - TSH with free T4 reflex; Future  - Please take your Toprol as directed      GERD  - Omeprazole as directed        Return in about 6 months (around 10/15/2022).        Emely Perez, CNP  [facility] - [place] [facility]

## 2022-04-20 ENCOUNTER — HOSPITAL ENCOUNTER (OUTPATIENT)
Dept: PHYSICAL THERAPY | Facility: OTHER | Age: 37
Discharge: HOME | End: 2022-04-20
Attending: NURSE PRACTITIONER
Payer: COMMERCIAL

## 2022-04-20 DIAGNOSIS — M54.16 LUMBAR BACK PAIN WITH RADICULOPATHY AFFECTING LEFT LOWER EXTREMITY: ICD-10-CM

## 2022-04-20 PROCEDURE — 97162 PT EVAL MOD COMPLEX 30 MIN: CPT | Mod: GP

## 2022-04-20 PROCEDURE — 97140 MANUAL THERAPY 1/> REGIONS: CPT | Mod: GP

## 2022-04-20 PROCEDURE — 97110 THERAPEUTIC EXERCISES: CPT | Mod: GP

## 2022-04-20 NOTE — PROGRESS NOTES
"   04/20/22 1349   General Information   Type of Visit Initial OP Ortho PT Evaluation   Start of Care Date 04/20/22   Referring Physician Emely queen NP   Patient/Family Goals Statement lessen pain in back and LE   Orders Evaluate and Treat   Date of Order 04/15/22   Certification Required? No   Medical Diagnosis Lumbar back pain with L LE radiculopathy   Surgical/Medical history reviewed Yes   Precautions/Limitations no known precautions/limitations   General Information Comments PMH - see chart (Anxiety, obesity, GERD, high blood pressure   Body Part(s)   Body Part(s) Lumbar Spine/SI   Presentation and Etiology   Pertinent history of current problem (include personal factors and/or comorbidities that impact the POC) Patient reports his back and lower extremity pain began on 3/25/22 stating he woke with this pain and can barely move the leg.  He denies any recent injuries or changes in activity.  He does recall several years ago having physical therapy for a \"tilted pelvis \".  He has tried 2 sessions of chiropractic adjustments without relief.  He also tried Voltaren gel with no relief.  He states the medications are not significantly helping his pain.  He has not tried any heat or cold.  He was seen by his primary care provider and is now referred to physical therapy.  He is to follow-up with her if he does not note improvements.  He has not had any diagnostic testing   Impairments A. Pain;B. Decreased WB tolerance   Functional Limitations perform activities of daily living;perform required work activities;perform desired leisure / sports activities   Symptom Location Bilateral low back pain left more so than right, left lower extremity pain along the anterior and posterior thigh to the knee level, left groin pain   How/Where did it occur At home   Onset date of current episode/exacerbation 03/25/22   Chronicity New   Pain rating (0-10 point scale) Best (/10);Worst (/10)   Best (/10) 3   Worst (/10) 9   Pain " quality A. Sharp;C. Aching;E. Shooting;F. Stabbing   Frequency of pain/symptoms A. Constant   Pain/symptoms are: The same all the time   Pain/symptoms exacerbated by A. Sitting;B. Walking;C. Lifting;D. Carrying;E. Rest;I. Bending;M. Other;J. ADL;K. Home tasks;L. Work tasks   Pain exacerbation comment Standing, sleeping,   Pain/symptoms eased by F. Certain positions   Progression of symptoms since onset: Unchanged   Current Level of Function   Patient role/employment history A. Employed   Employment Comments  for [employer]   Fall Risk Screen   Fall screen completed by PT   Have you fallen 2 or more times in the past year? No   Have you fallen and had an injury in the past year? No   Is patient a fall risk? No   Abuse Screen (yes response referral indicated)   Feels Unsafe at Home or Work/School no   Feels Threatened by Someone no   Does Anyone Try to Keep You From Having Contact with Others or Doing Things Outside Your Home? no   Physical Signs of Abuse Present no   Patient needs abuse support services and resources No   Lumbar Spine/SI Objective Findings   Hamstring Flexibility Hypomobile left more so than right   Quadricep Flexibility Hypomobile bilaterally   Piriformis Flexibility Hypomobile bilaterally   Flexion ROM 0-18 w B LS pain   Extension ROM 0-10 w B LS pain   Right Side Bending ROM 0-31   Left Side Bending ROM 0-10 w L LB and lateral thigh pain   Repeated Extension-Standing ROM increased L LB and LE pain   Pelvic Screen Unable to perform standing forward flexion or sitting forward flexion test due to limited mobility   Hip Screen Decreased external rotation on left with positive piriformis shortening   Lumbar/Hip/Knee/Foot Strength Comments He was able to heel and toe walk   SLR Positive on left   Crossover SLR Negative   Segmental Mobility FRS right L5   Palpation Soft tissue tension and tenderness palpation especially along the left greater than right lumbosacral region, the left  gluteal/piriformis muscles   Slump Test Positive on left   Observation Patient is moving quite slowly and cautiously due to pain   Posture Positive lateral shift with hips to right shoulder to left   Planned Therapy Interventions   Planned Therapy Interventions manual therapy;ROM;strengthening;stretching   Planned Modality Interventions   Planned Modality Interventions Comments Modalities as indicated   Clinical Impression   Criteria for Skilled Therapeutic Interventions Met yes, treatment indicated   PT Diagnosis Low back pain with left radiculopathy   Influenced by the following impairments Pain, decreased mobility, decreased strength   Functional limitations due to impairments Sitting, walking, lifting, carrying, ADLs, household tasks, work tasks, bending, standing, sleeping   Clinical Presentation Evolving/Changing   Clinical Presentation Rationale Clinical judgment-low back pain with now radiating symptoms into the lower extremity   Clinical Decision Making (Complexity) Moderate complexity   Therapy Frequency 2 times/Week   Predicted Duration of Therapy Intervention (days/wks) Up to 6 weeks   Risk & Benefits of therapy have been explained Yes   Patient, Family & other staff in agreement with plan of care Yes   Clinical Impression Comments Patient presents with bilateral low back pain left more so than right with left lower extremity pain.  He does have positive tests including slump and straight leg raise and a positive lateral shift   Education Assessment   Barriers to Learning No barriers   ORTHO GOALS   PT Ortho Eval Goals 1;3;2   Ortho Goal 1   Goal Identifier STG 1   Goal Description Decreased pain when at its worst 28/10   Target Date 05/04/22   Ortho Goal 2   Goal Identifier LTG 1   Goal Description Patient will demonstrate independence with home exercise program   Target Date 06/01/22   Ortho Goal 3   Goal Identifier LTG 2   Goal Description Patient was able to stand for 4 5+ minutes without significant  increased back or leg pain 75% of the time   Target Date 06/01/22   Total Evaluation Time   PT Eval, Moderate Complexity Minutes (65874) 35

## 2022-04-26 ENCOUNTER — HOSPITAL ENCOUNTER (OUTPATIENT)
Dept: PHYSICAL THERAPY | Facility: OTHER | Age: 37
Discharge: HOME | End: 2022-04-26
Attending: NURSE PRACTITIONER
Payer: COMMERCIAL

## 2022-04-26 PROCEDURE — 97035 APP MDLTY 1+ULTRASOUND EA 15: CPT | Mod: GP

## 2022-04-26 PROCEDURE — 97140 MANUAL THERAPY 1/> REGIONS: CPT | Mod: GP

## 2022-05-14 ENCOUNTER — HEALTH MAINTENANCE LETTER (OUTPATIENT)
Age: 37
End: 2022-05-14

## 2022-06-07 DIAGNOSIS — F41.9 ANXIETY: ICD-10-CM

## 2022-06-08 RX ORDER — ESCITALOPRAM OXALATE 10 MG/1
TABLET ORAL
Qty: 90 TABLET | Refills: 1 | Status: SHIPPED | OUTPATIENT
Start: 2022-06-08 | End: 2023-03-15

## 2022-06-08 NOTE — TELEPHONE ENCOUNTER
LEXAPRO      Last Written Prescription Date:  12-7-2021  Last Fill Quantity: 90,   # refills: 1  Last Office Visit: 4-  Future Office visit:       Routing refill request to provider for review/approval because:  Drug not on the [facility], [facility] or [facility] refill protocol or controlled substance

## 2022-06-23 DIAGNOSIS — M54.16 LUMBAR BACK PAIN WITH RADICULOPATHY AFFECTING LEFT LOWER EXTREMITY: ICD-10-CM

## 2022-06-27 NOTE — TELEPHONE ENCOUNTER
Tylenol #3     Last Written Prescription Date:  4.15.2022  Last Fill Quantity: 40,   # refills: 0  Last Office Visit: 4.15.2022  Future Office visit:       Routing refill request to provider for review/approval because:  Drug not on the [facility], [facility] or [facility] refill protocol or controlled substance    Melinda Post RN

## 2022-07-26 ENCOUNTER — NURSE TRIAGE (OUTPATIENT)
Dept: FAMILY MEDICINE | Facility: OTHER | Age: 37
End: 2022-07-26

## 2022-07-26 NOTE — TELEPHONE ENCOUNTER
"Patient is taking tylenol and ibuprofen and penicillin for the pain and abscess from dental surgery on Friday,  Still is in excruciating pain.  Advised to call the surgeon and to call back if surgeon reccommends    Reason for Disposition    SEVERE toothache pain    Answer Assessment - Initial Assessment Questions  1. LOCATION: \"Which tooth is hurting?\"  (e.g., right-side/left-side, upper/lower, front/back)      2 molars on left top and bottom  2. ONSET: \"When did the toothache start?\"  (e.g., hours, days)       Friday had surgery  3. SEVERITY: \"How bad is the toothache?\"  (Scale 1-10; mild, moderate or severe)    - MILD (1-3): doesn't interfere with chewing     - MODERATE (4-7): interferes with chewing, interferes with normal activities, awakens from sleep      - SEVERE (8-10): unable to eat, unable to do any normal activities, excruciating pain         severe  4. SWELLING: \"Is there any visible swelling of your face?\"      yes  5. OTHER SYMPTOMS: \"Do you have any other symptoms?\" (e.g., fever)      Fever this past weekend but not now  6. PREGNANCY: \"Is there any chance you are pregnant?\" \"When was your last menstrual period?\"      no    Protocols used: TOOTHACHE-A-OH      "

## 2022-07-28 NOTE — PROGRESS NOTES
[facility]    Outpatient Physical Therapy Discharge Note  Patient: Nhan Sánchez  : 1985    Beginning/End Dates of Reporting Period:  22 to 22    Referring Provider: Emely Perez NP    Therapy Diagnosis: Left low back pain with left lower extremity radiculopathy     Client Self Report: Pt reports his back pain is better but still has  the L leg pain and cramping along the posterior thigh to the knee level.    Objective Measurements:                                          Outcome Measures (most recent score):      Goals:  Goal Identifier STG 1   Goal Description Decreased pain when at its worst  to a 8/10   Target Date 22   Date Met      Progress (detail required for progress note):       Goal Identifier LTG 1   Goal Description Patient will demonstrate independence with home exercise program   Target Date 22   Date Met      Progress (detail required for progress note):       Goal Identifier LTG 2   Goal Description Patient was able to stand for 4 5+ minutes without significant increased back or leg pain 75% of the time   Target Date 22   Date Met      Progress (detail required for progress note):       Goal Identifier     Goal Description     Target Date     Date Met      Progress (detail required for progress note):       Goal Identifier     Goal Description     Target Date     Date Met      Progress (detail required for progress note):       Goal Identifier     Goal Description     Target Date     Date Met      Progress (detail required for progress note):       Goal Identifier     Goal Description     Target Date     Date Met      Progress (detail required for progress note):       Goal Identifier     Goal Description     Target Date     Date Met      Progress (detail required for progress note):             Plan:  Discharge from therapy.    Discharge:    Reason for  Discharge: Patient was seen for 2 physical therapy sessions with his last session being on 4/26/22.  Patient canceled or no showed his remaining sessions and has not rescheduled    Equipment Issued: None        Discharge Plan: Discharge from PT-status is unknown

## 2022-09-04 ENCOUNTER — HEALTH MAINTENANCE LETTER (OUTPATIENT)
Age: 37
End: 2022-09-04

## 2022-10-19 DIAGNOSIS — M54.16 LUMBAR BACK PAIN WITH RADICULOPATHY AFFECTING LEFT LOWER EXTREMITY: ICD-10-CM

## 2022-10-19 RX ORDER — CYCLOBENZAPRINE HCL 10 MG
10 TABLET ORAL 2 TIMES DAILY PRN
Qty: 60 TABLET | Refills: 1 | Status: SHIPPED | OUTPATIENT
Start: 2022-10-19 | End: 2023-09-12

## 2022-11-02 DIAGNOSIS — K21.00 GASTROESOPHAGEAL REFLUX DISEASE WITH ESOPHAGITIS WITHOUT HEMORRHAGE: ICD-10-CM

## 2022-11-02 DIAGNOSIS — I10 PRIMARY HYPERTENSION: ICD-10-CM

## 2022-11-03 RX ORDER — OMEPRAZOLE 40 MG/1
CAPSULE, DELAYED RELEASE ORAL
Qty: 90 CAPSULE | Refills: 1 | Status: SHIPPED | OUTPATIENT
Start: 2022-11-03 | End: 2023-03-22

## 2022-11-03 RX ORDER — METOPROLOL SUCCINATE 50 MG/1
50 TABLET, EXTENDED RELEASE ORAL DAILY
Qty: 90 TABLET | Refills: 1 | Status: SHIPPED | OUTPATIENT
Start: 2022-11-03 | End: 2023-03-22

## 2023-03-15 ENCOUNTER — TELEPHONE (OUTPATIENT)
Dept: FAMILY MEDICINE | Facility: OTHER | Age: 38
End: 2023-03-15

## 2023-03-15 DIAGNOSIS — F41.9 ANXIETY: ICD-10-CM

## 2023-03-15 RX ORDER — ESCITALOPRAM OXALATE 10 MG/1
10 TABLET ORAL DAILY
Qty: 20 TABLET | Refills: 0 | Status: SHIPPED | OUTPATIENT
Start: 2023-03-15 | End: 2023-03-22

## 2023-03-15 NOTE — TELEPHONE ENCOUNTER
Escitalopram (Lexapro) 10 MG tablet     Last Written Prescription Date:  06/08/2022  Last Fill Quantity: 90,   # refills: 1  Last Office Visit: 04/15/2022

## 2023-03-15 NOTE — TELEPHONE ENCOUNTER
Patient is looking for a prescription for ecitalopram, lexapro to faxed to [facility] in [place].  Patient has 1 pill left before he leaves out of town tomorrow 03-16-23.    Please call patient when done.    Thank you,  Annabelle Larson

## 2023-03-15 NOTE — TELEPHONE ENCOUNTER
Emely sent in short supply of medication. Patient needs appt scheduled for follow up. Please schedule.

## 2023-03-15 NOTE — TELEPHONE ENCOUNTER
Patient requesting refill. Pended for you LOV 04/15/2022. States he only has 1 and is leaving town tomorrow.

## 2023-03-17 RX ORDER — ESCITALOPRAM OXALATE 10 MG/1
TABLET ORAL
Qty: 90 TABLET | Refills: 0 | OUTPATIENT
Start: 2023-03-17

## 2023-03-21 NOTE — PROGRESS NOTES
Assessment & Plan          Primary hypertension  - metoprolol succinate ER (TOPROL XL) 50 MG 24 hr tablet; Take 1 tablet (50 mg) by mouth 2 times daily      Anxiety  - escitalopram (LEXAPRO) 20 MG tablet; Take 1 tablet (20 mg) by mouth daily      Gastroesophageal reflux disease with esophagitis without hemorrhage  - omeprazole (PRILOSEC) 40 MG DR capsule; Take 1 capsule (40 mg) by mouth daily      Obesity  - Weight management referral placed - [facility]        Return in about 6 months (around 9/22/2023).           Emely Perez, CNP  [facility] - KAE Justin is a 38 year old, presenting for the following health issues:  Anxiety and Hyperlipidemia        Hypertension Follow-up    Do you check your blood pressure regularly outside of the clinic? No     Are you following a low salt diet? No    Are your blood pressures ever more than 140 on the top number (systolic) OR more   than 90 on the bottom number (diastolic), for example 140/90? No        Anxiety Follow-Up    How are you doing with your anxiety since your last visit? worsened    Are you having other symptoms that might be associated with anxiety? Yes:  panic's in groups    Have you had a significant life event? No     Are you feeling depressed? No    Do you have any concerns with your use of alcohol or other drugs? No         Social History     Tobacco Use     Smoking status: Former     Packs/day: 0.50     Years: 13.00     Pack years: 6.50     Types: Cigarettes     Smokeless tobacco: Never     Tobacco comments:     tried to quit - longest period tobacco free: 2 years - no passive smoke exposure   Substance Use Topics     Alcohol use: Yes     Alcohol/week: 0.0 standard drinks     Comment: occasion     Drug use: No         AL-7 SCORE 8/31/2020 12/7/2021 3/22/2023   Total Score 2 15 4         PHQ 8/31/2020 12/7/2021 3/22/2023   PHQ-9 Total Score 3 4 2   Q9: Thoughts of better off dead/self-harm past 2 weeks Not at all Not at all  Not at all             GERD/Heartburn  Onset/Duration: ongoing, well controlled with omeprazole           Patient Active Problem List   Diagnosis     ACP (advance care planning)     Morbid obesity (H)     Primary hypertension     Anxiety     Gastroesophageal reflux disease with esophagitis without hemorrhage     Past Surgical History:   Procedure Laterality Date     CIRCUMCISION       COLONOSCOPY N/A 4/11/2016    Procedure: COLONOSCOPY;  Surgeon: Marely Hunt MD;  Location: [facility]     TONSILLECTOMY       wisdom teeth extracted         Social History     Tobacco Use     Smoking status: Former     Packs/day: 0.50     Years: 13.00     Pack years: 6.50     Types: Cigarettes     Smokeless tobacco: Never     Tobacco comments:     tried to quit - longest period tobacco free: 2 years - no passive smoke exposure   Substance Use Topics     Alcohol use: Yes     Alcohol/week: 0.0 standard drinks     Comment: occasion     Family History   Problem Relation Age of Onset     C.A.D. Other         family h/o     Cerebrovascular Disease Other         CVA - family h/o             Current Outpatient Medications   Medication Sig Dispense Refill     cyclobenzaprine (FLEXERIL) 10 MG tablet Take 1 tablet (10 mg) by mouth 2 times daily as needed for muscle spasms 60 tablet 1     escitalopram (LEXAPRO) 20 MG tablet Take 1 tablet (20 mg) by mouth daily 90 tablet 1     ibuprofen (ADVIL/MOTRIN) 800 MG tablet Take 1 tablet (800 mg) by mouth every 8 hours as needed for moderate pain 90 tablet 1     metoprolol succinate ER (TOPROL XL) 50 MG 24 hr tablet Take 1 tablet (50 mg) by mouth 2 times daily 180 tablet 1     omeprazole (PRILOSEC) 40 MG DR capsule Take 1 capsule (40 mg) by mouth daily 90 capsule 1         Allergies   Allergen Reactions     Azithromycin Other (See Comments) and Hives     Zithromax - urticaria     Diphenhydramine Hcl Hives     Benadryl     Dust Mites          Recent Labs   Lab Test 04/15/22  0939 12/07/21  5048  10/14/16  1719 03/21/16  1608   LDL  --  88  --   --    HDL  --  32*  --   --    TRIG  --  141  --   --    ALT  --  49 70 88*   CR  --  1.03 0.98 0.94   GFRESTIMATED  --  >90 >90  Non  GFR Calc   >90  Non  GFR Calc     GFRESTBLACK  --   --  >90   GFR Calc   >90   GFR Calc     POTASSIUM  --  4.1 4.3 4.1   TSH 3.75 4.42* 3.48 6.96*            BP Readings from Last 3 Encounters:   03/22/23 (!) 142/94   04/15/22 134/88   12/07/21 130/88    Wt Readings from Last 3 Encounters:   03/22/23 (!) 166.9 kg (368 lb)   04/15/22 (!) 151.5 kg (333 lb 14.4 oz)   12/07/21 (!) 159.2 kg (351 lb)                Review of Systems   Constitutional, HEENT, cardiovascular, pulmonary, gi and gu systems are negative, except as otherwise noted.          Objective    BP (!) 142/94 (BP Location: Left arm, Patient Position: Sitting, Cuff Size: Adult Large)   Pulse 96   Temp 98.9  F (37.2  C) (Tympanic)   Resp 12   Wt (!) 166.9 kg (368 lb)   SpO2 97%   BMI 54.34 kg/m    Body mass index is 54.34 kg/m .         Physical Exam   GENERAL: healthy, alert and no distress  GENERAL: healthy, alert and no distress  NECK: no adenopathy, no asymmetry, masses, or scars and thyroid normal to palpation  RESP: lungs clear to auscultation - no rales, rhonchi or wheezes  CV: regular rate and rhythm, normal S1 S2, no S3 or S4, no murmur, click or rub, no peripheral edema and peripheral pulses strong  MS: no gross musculoskeletal defects noted, no edema  SKIN: no suspicious lesions or rashes  PSYCH: mentation appears normal, affect normal/bright

## 2023-03-22 ENCOUNTER — OFFICE VISIT (OUTPATIENT)
Dept: FAMILY MEDICINE | Facility: OTHER | Age: 38
End: 2023-03-22
Attending: NURSE PRACTITIONER
Payer: COMMERCIAL

## 2023-03-22 VITALS
TEMPERATURE: 98.9 F | BODY MASS INDEX: 54.34 KG/M2 | RESPIRATION RATE: 12 BRPM | DIASTOLIC BLOOD PRESSURE: 94 MMHG | HEART RATE: 96 BPM | SYSTOLIC BLOOD PRESSURE: 142 MMHG | WEIGHT: 315 LBS | OXYGEN SATURATION: 97 %

## 2023-03-22 DIAGNOSIS — I10 PRIMARY HYPERTENSION: Primary | ICD-10-CM

## 2023-03-22 DIAGNOSIS — F41.9 ANXIETY: ICD-10-CM

## 2023-03-22 DIAGNOSIS — K21.00 GASTROESOPHAGEAL REFLUX DISEASE WITH ESOPHAGITIS WITHOUT HEMORRHAGE: ICD-10-CM

## 2023-03-22 DIAGNOSIS — E66.01 MORBID OBESITY (H): ICD-10-CM

## 2023-03-22 PROCEDURE — 99214 OFFICE O/P EST MOD 30 MIN: CPT | Performed by: NURSE PRACTITIONER

## 2023-03-22 RX ORDER — OMEPRAZOLE 40 MG/1
40 CAPSULE, DELAYED RELEASE ORAL DAILY
Qty: 90 CAPSULE | Refills: 1 | Status: SHIPPED | OUTPATIENT
Start: 2023-03-22 | End: 2023-12-20

## 2023-03-22 RX ORDER — METOPROLOL SUCCINATE 50 MG/1
50 TABLET, EXTENDED RELEASE ORAL 2 TIMES DAILY
Qty: 180 TABLET | Refills: 1 | Status: SHIPPED | OUTPATIENT
Start: 2023-03-22 | End: 2023-09-22

## 2023-03-22 RX ORDER — ESCITALOPRAM OXALATE 20 MG/1
20 TABLET ORAL DAILY
Qty: 90 TABLET | Refills: 1 | Status: SHIPPED | OUTPATIENT
Start: 2023-03-22 | End: 2023-09-21

## 2023-03-22 ASSESSMENT — ANXIETY QUESTIONNAIRES
2. NOT BEING ABLE TO STOP OR CONTROL WORRYING: NOT AT ALL
4. TROUBLE RELAXING: NOT AT ALL
6. BECOMING EASILY ANNOYED OR IRRITABLE: SEVERAL DAYS
5. BEING SO RESTLESS THAT IT IS HARD TO SIT STILL: NOT AT ALL
3. WORRYING TOO MUCH ABOUT DIFFERENT THINGS: SEVERAL DAYS
1. FEELING NERVOUS, ANXIOUS, OR ON EDGE: SEVERAL DAYS
7. FEELING AFRAID AS IF SOMETHING AWFUL MIGHT HAPPEN: SEVERAL DAYS
GAD7 TOTAL SCORE: 4
IF YOU CHECKED OFF ANY PROBLEMS ON THIS QUESTIONNAIRE, HOW DIFFICULT HAVE THESE PROBLEMS MADE IT FOR YOU TO DO YOUR WORK, TAKE CARE OF THINGS AT HOME, OR GET ALONG WITH OTHER PEOPLE: NOT DIFFICULT AT ALL
GAD7 TOTAL SCORE: 4

## 2023-03-22 ASSESSMENT — PATIENT HEALTH QUESTIONNAIRE - PHQ9: SUM OF ALL RESPONSES TO PHQ QUESTIONS 1-9: 2

## 2023-03-22 ASSESSMENT — PAIN SCALES - GENERAL: PAINLEVEL: NO PAIN (0)

## 2023-03-22 NOTE — PATIENT INSTRUCTIONS
Assessment & Plan          Primary hypertension  - metoprolol succinate ER (TOPROL XL) 50 MG 24 hr tablet; Take 1 tablet (50 mg) by mouth 2 times daily      Anxiety  - escitalopram (LEXAPRO) 20 MG tablet; Take 1 tablet (20 mg) by mouth daily      Gastroesophageal reflux disease with esophagitis without hemorrhage  - omeprazole (PRILOSEC) 40 MG DR capsule; Take 1 capsule (40 mg) by mouth daily          Return in about 6 months (around 9/22/2023).         Emely Perez CNP  [facility]

## 2023-06-03 ENCOUNTER — HEALTH MAINTENANCE LETTER (OUTPATIENT)
Age: 38
End: 2023-06-03

## 2023-09-12 ENCOUNTER — TELEPHONE (OUTPATIENT)
Dept: FAMILY MEDICINE | Facility: OTHER | Age: 38
End: 2023-09-12

## 2023-09-12 ENCOUNTER — OFFICE VISIT (OUTPATIENT)
Dept: FAMILY MEDICINE | Facility: OTHER | Age: 38
End: 2023-09-12
Attending: NURSE PRACTITIONER
Payer: COMMERCIAL

## 2023-09-12 VITALS
WEIGHT: 315 LBS | TEMPERATURE: 98.7 F | OXYGEN SATURATION: 97 % | BODY MASS INDEX: 53.66 KG/M2 | SYSTOLIC BLOOD PRESSURE: 140 MMHG | DIASTOLIC BLOOD PRESSURE: 82 MMHG | HEART RATE: 95 BPM

## 2023-09-12 DIAGNOSIS — M54.41 ACUTE BILATERAL LOW BACK PAIN WITH BILATERAL SCIATICA: ICD-10-CM

## 2023-09-12 DIAGNOSIS — E66.01 MORBID OBESITY (H): ICD-10-CM

## 2023-09-12 DIAGNOSIS — M54.42 ACUTE BILATERAL LOW BACK PAIN WITH BILATERAL SCIATICA: ICD-10-CM

## 2023-09-12 DIAGNOSIS — M54.42 CHRONIC BILATERAL LOW BACK PAIN WITH BILATERAL SCIATICA: ICD-10-CM

## 2023-09-12 DIAGNOSIS — M62.838 MUSCLE SPASM: Primary | ICD-10-CM

## 2023-09-12 DIAGNOSIS — M54.16 LUMBAR BACK PAIN WITH RADICULOPATHY AFFECTING LEFT LOWER EXTREMITY: ICD-10-CM

## 2023-09-12 DIAGNOSIS — M54.41 CHRONIC BILATERAL LOW BACK PAIN WITH BILATERAL SCIATICA: ICD-10-CM

## 2023-09-12 DIAGNOSIS — G89.29 CHRONIC BILATERAL LOW BACK PAIN WITH BILATERAL SCIATICA: ICD-10-CM

## 2023-09-12 PROCEDURE — 99214 OFFICE O/P EST MOD 30 MIN: CPT | Performed by: NURSE PRACTITIONER

## 2023-09-12 RX ORDER — PREDNISONE 20 MG/1
TABLET ORAL
Qty: 20 TABLET | Refills: 0 | Status: SHIPPED | OUTPATIENT
Start: 2023-09-12 | End: 2024-01-03

## 2023-09-12 ASSESSMENT — PAIN SCALES - GENERAL: PAINLEVEL: WORST PAIN (10)

## 2023-09-12 NOTE — TELEPHONE ENCOUNTER
8:23 AM    Reason for Call: Phone Call    Description: Patient's wife called in asking for an appointment today for the patient as he is having back spasms. Please call patient or wife back.     Was an appointment offered for this call? No  If yes : Appointment type              Date    Preferred method for responding to this message: Telephone Call  What is your phone number ? [phone]     If we cannot reach you directly, may we leave a detailed response at the number you provided? Yes    Can this message wait until your PCP/provider returns, if available today? Not applicable, provider in clinic    Razia William

## 2023-09-12 NOTE — PROGRESS NOTES
Assessment & Plan     Muscle spasm  - tiZANidine (ZANAFLEX) 4 MG tablet; Take 1 tablet (4 mg) by mouth 3 times daily    Lumbar back pain with radiculopathy affecting left lower extremity  - tiZANidine (ZANAFLEX) 4 MG tablet; Take 1 tablet (4 mg) by mouth 3 times daily  - predniSONE (DELTASONE) 20 MG tablet; Take 3 tabs by mouth daily x 3 days, then 2 tabs daily x 3 days, then 1 tab daily x 3 days, then 1/2 tab daily x 3 days.  - MR Lumbar Spine w/o Contrast; Future    Acute bilateral low back pain with bilateral sciatica  - predniSONE (DELTASONE) 20 MG tablet; Take 3 tabs by mouth daily x 3 days, then 2 tabs daily x 3 days, then 1 tab daily x 3 days, then 1/2 tab daily x 3 days.    Chronic bilateral low back pain with bilateral sciatica  - MR Lumbar Spine w/o Contrast; Future    Morbid obesity (H)  Lengthy discussion about role obesity can play in chronic back pain. Accepting of referral to weight management.   - Adult Comprehensive Weight Management  Referral; Future    Ordering of each unique test  Prescription drug management  I spent a total of 22 minutes on the day of the visit.   Time spent by me doing chart review, history and exam, documentation and further activities per the note      Return if symptoms worsen or fail to improve.    Michelle Oliva, CNP  [facility] - [place] Justin is a 38 year old, presenting for the following health issues:  Musculoskeletal Problem        9/12/2023     3:25 PM   Additional Questions   Roomed by Dary BILLY LPN   Accompanied by self       HPI     Concern - Back Spasms  Onset: 2 nights ago started back up - started a few years ago (less intense)  Description: 10/10 pain when spasms occur - locks up back  Intensity: severe  Progression of Symptoms:  worsening  Accompanying Signs & Symptoms: fell out of bed, spasms come in waves of 2-3, laid on the floor for 45 minutes in pain - affecting sleep and work schedule (hasn't worked in 2  days)  Previous history of similar problem: Yes  Precipitating factors:        Worsened by: fluctuating spasms - just randomly happening  Alleviating factors:        Improved by: Ibuprofen DOESN'T help   Therapies tried and outcome: PT and Chiro. - pt states chiro made it much worse      MRI of lumbar last 10 years ago.       Review of Systems   Constitutional, HEENT, cardiovascular, pulmonary, gi and gu systems are negative, except as otherwise noted.      Objective    BP (!) 140/82 (BP Location: Right arm, Patient Position: Sitting, Cuff Size: Adult Regular)   Pulse 95   Temp 98.7  F (37.1  C) (Tympanic)   Wt (!) 164.8 kg (363 lb 6.4 oz)   SpO2 97%   BMI 53.66 kg/m    Body mass index is 53.66 kg/m .      Physical Exam   GENERAL: healthy, alert and no distress  NECK: no adenopathy, no asymmetry, masses, or scars and thyroid normal to palpation  RESP: lungs clear to auscultation - no rales, rhonchi or wheezes  CV: regular rate and rhythm, normal S1 S2, no S3 or S4, no murmur, click or rub, no peripheral edema and peripheral pulses strong  MS: Shifts weight frequently in chair. Increased time to move from sitting to standing. Lumbar tenderness with spasms present. Extremities normal- no gross deformities noted.   SKIN: no suspicious lesions or rashes  PSYCH: mentation appears normal, affect normal/bright    No results found for any visits on 09/12/23.

## 2023-09-20 ENCOUNTER — HOSPITAL ENCOUNTER (OUTPATIENT)
Dept: MRI IMAGING | Facility: HOSPITAL | Age: 38
Discharge: HOME | End: 2023-09-20
Attending: NURSE PRACTITIONER | Admitting: NURSE PRACTITIONER
Payer: COMMERCIAL

## 2023-09-20 DIAGNOSIS — G89.29 CHRONIC BILATERAL LOW BACK PAIN WITH BILATERAL SCIATICA: ICD-10-CM

## 2023-09-20 DIAGNOSIS — M54.42 CHRONIC BILATERAL LOW BACK PAIN WITH BILATERAL SCIATICA: ICD-10-CM

## 2023-09-20 DIAGNOSIS — M54.16 LUMBAR BACK PAIN WITH RADICULOPATHY AFFECTING LEFT LOWER EXTREMITY: ICD-10-CM

## 2023-09-20 DIAGNOSIS — M54.41 CHRONIC BILATERAL LOW BACK PAIN WITH BILATERAL SCIATICA: ICD-10-CM

## 2023-09-20 DIAGNOSIS — I10 PRIMARY HYPERTENSION: ICD-10-CM

## 2023-09-20 DIAGNOSIS — F41.9 ANXIETY: ICD-10-CM

## 2023-09-20 PROCEDURE — 72148 MRI LUMBAR SPINE W/O DYE: CPT

## 2023-09-21 RX ORDER — ESCITALOPRAM OXALATE 20 MG/1
20 TABLET ORAL DAILY
Qty: 90 TABLET | Refills: 1 | Status: SHIPPED | OUTPATIENT
Start: 2023-09-21 | End: 2024-02-26

## 2023-09-21 NOTE — TELEPHONE ENCOUNTER
Toprol      Last Written Prescription Date:  7.10.23  Last Fill Quantity: #180,   # refills: 0  Last Office Visit: 3.22.23  Future Office visit:    Next 5 appointments (look out 90 days)      Oct 03, 2023  4:00 PM  (Arrive by 3:45 PM)  SHORT with Emely Perez CNP  [facility] ([facility] ) [address]  [facility] [address]  [phone]             Routing refill request to provider for review/approval because:

## 2023-09-22 ENCOUNTER — TELEPHONE (OUTPATIENT)
Dept: FAMILY MEDICINE | Facility: OTHER | Age: 38
End: 2023-09-22

## 2023-09-22 DIAGNOSIS — M54.16 LUMBAR BACK PAIN WITH RADICULOPATHY AFFECTING LEFT LOWER EXTREMITY: Primary | ICD-10-CM

## 2023-09-22 RX ORDER — METOPROLOL SUCCINATE 50 MG/1
50 TABLET, EXTENDED RELEASE ORAL 2 TIMES DAILY
Qty: 180 TABLET | Refills: 0 | Status: SHIPPED | OUTPATIENT
Start: 2023-09-22 | End: 2023-12-27

## 2023-09-22 NOTE — TELEPHONE ENCOUNTER
Call placed to Nhan re: MRI.     Pt reports symptoms stable to improved. Muscle spasms have stopped.    Reviewed results. Would like [facility] for neurosurgical referral.      Michelle Oliva, APRN, CNP

## 2023-10-24 ENCOUNTER — TRANSFERRED RECORDS (OUTPATIENT)
Dept: HEALTH INFORMATION MANAGEMENT | Facility: HOSPITAL | Age: 38
End: 2023-10-24

## 2023-11-27 ENCOUNTER — TRANSFERRED RECORDS (OUTPATIENT)
Dept: HEALTH INFORMATION MANAGEMENT | Facility: CLINIC | Age: 38
End: 2023-11-27

## 2023-12-19 DIAGNOSIS — K21.00 GASTROESOPHAGEAL REFLUX DISEASE WITH ESOPHAGITIS WITHOUT HEMORRHAGE: ICD-10-CM

## 2023-12-19 NOTE — TELEPHONE ENCOUNTER
Omeprazole (Prilosec) 40 mg DR capsule  Take 1 capsule (40 mg) by mouth daily     Last Written Prescription Date:  3-  Last Fill Quantity: 90 capsule,   # refills: 1  Last Office Visit: 9-  Future Office visit:    Next 5 appointments (look out 90 days)      Jan 05, 2024  3:30 PM  (Arrive by 3:15 PM)  SHORT with Brenda Stockton NP  [facility] [place] ([facility] [place] ) [address]  [place] [address]  [phone]

## 2023-12-20 RX ORDER — OMEPRAZOLE 40 MG/1
40 CAPSULE, DELAYED RELEASE ORAL DAILY
Qty: 90 CAPSULE | Refills: 0 | Status: SHIPPED | OUTPATIENT
Start: 2023-12-20 | End: 2024-02-26

## 2023-12-23 DIAGNOSIS — I10 PRIMARY HYPERTENSION: ICD-10-CM

## 2023-12-26 NOTE — TELEPHONE ENCOUNTER
Toprol      Last Written Prescription Date:  9/22/23  Last Fill Quantity: 180,   # refills: 0  Last Office Visit: 9/12/23  Future Office visit:    Next 5 appointments (look out 90 days)      Jan 05, 2024  3:30 PM  (Arrive by 3:15 PM)  SHORT with Brenda Stockton NP  [facility] [place] ([facility] [place] ) [address]  [place] [address]  [phone]             Routing refill request to provider for review/approval because:

## 2023-12-27 RX ORDER — METOPROLOL SUCCINATE 50 MG/1
50 TABLET, EXTENDED RELEASE ORAL 2 TIMES DAILY
Qty: 180 TABLET | Refills: 0 | Status: SHIPPED | OUTPATIENT
Start: 2023-12-27 | End: 2024-02-26

## 2024-02-06 NOTE — PROGRESS NOTES
"Nhan Sánchez is presenting for evaluation of medical weight management. He has had weight problems for about 10 years. He admits that he got hurt kick-boxing and had to give it up. Then gained weight. Maximum weight is his current weight of 369 pounds. He would like to lose 150 pounds. Goal weight is 220 pounds. Other methods the patient has tried to lose weight include diet and exercise.     Processed Foods: some  Alcohol: drinks deo and coke 1-2 times per month  Soda/pop: none; gave this up 2 months ago  Meals Prepared by: wife  Psychologic issues: some anxiety; controlled with Lexapro   Family Support: good-wife  Activity/Exercise: none  Insight/Motivation: good-wants to feel better  Sleep habits: fair, snores, does have DARRICK, but can't sleep with his CPAP  Cravings: none  Appetite: good  Medications contributing to weight issues: lexapro may cause some weight gain    Co-morbidities   Patient Active Problem List   Diagnosis    ACP (advance care planning)    Morbid obesity (H)    Primary hypertension    Anxiety    Gastroesophageal reflux disease with esophagitis without hemorrhage     Meds:     Current Outpatient Medications   Medication    escitalopram (LEXAPRO) 20 MG tablet    metoprolol succinate ER (TOPROL XL) 50 MG 24 hr tablet    omeprazole (PRILOSEC) 40 MG DR capsule    ibuprofen (ADVIL/MOTRIN) 800 MG tablet    tiZANidine (ZANAFLEX) 4 MG tablet     No current facility-administered medications for this visit.       No current facility-administered medications for this visit.     Current Eating Patterns:   Generally 3 meals per day.   Breakfast: gas station food-typically breakfast pizza  Midmorning snack: none  Lunch: none  Mid afternoon snack: none  Supper: meat and potatoes, veggies, rice, pasta, pizza  Grazing: popcorn before bed    OBJECTIVE:   BP (!) 148/78   Pulse 80   Temp 97.4  F (36.3  C) (Tympanic)   Ht 1.753 m (5' 9\")   Wt (!) 167.5 kg (369 lb 4.3 oz)   SpO2 95%   BMI 54.53 kg/m  "       Physical Exam   Exam:  Constitutional: healthy, alert, no distress, and over weight  Psychiatric: mentation appears normal and affect normal/bright    ASSESSMENT:   -Obesity Stage morbid. Body mass index is 54.53.  -GERD: became a problem with the weight gain. Should improve with weight loss.   -Anxiety: controlled with Lexapro. Will monitor. Lexapro may be impeding weight loss.   -HTN: should improve with weight loss. Only taking his metoprolol once daily-will increase to twice daily.   -No recent Vit D or glucose. Will check today.   -TSH has been elevated in the past, will recheck today.   -Mother had thyroid cancer, unsure what type. He will find out more information. Will avoid starting Saxtenda or Wegovy until we have more information.     Protein Goal: 70-90 grams  Carb Limit: less than 50 grams  Fat Goal: 60-70% of calories  Calorie Goal: 1200    PLAN: Initiated a low carb diet. Will avoid high carb foods. Went over protein/carbohydrate/fat recommendations. Reminded patient to focus on getting appropriate amounts of protein. Discussed the rational for eating higher fat. This equates to around 60- 70% fat. Limit or avoid sugar, starches, bread products, rice and cereal, most grains and simple carbohydrates. Goal set for 6 lb weight loss over the next 4 weeks. F/U in 4 weeks with myself.

## 2024-02-07 ENCOUNTER — TELEPHONE (OUTPATIENT)
Dept: FAMILY MEDICINE | Facility: OTHER | Age: 39
End: 2024-02-07

## 2024-02-07 ENCOUNTER — OFFICE VISIT (OUTPATIENT)
Dept: FAMILY MEDICINE | Facility: OTHER | Age: 39
End: 2024-02-07
Attending: NURSE PRACTITIONER
Payer: COMMERCIAL

## 2024-02-07 VITALS
BODY MASS INDEX: 46.65 KG/M2 | WEIGHT: 315 LBS | TEMPERATURE: 97.4 F | HEART RATE: 80 BPM | SYSTOLIC BLOOD PRESSURE: 148 MMHG | DIASTOLIC BLOOD PRESSURE: 78 MMHG | OXYGEN SATURATION: 95 % | HEIGHT: 69 IN

## 2024-02-07 DIAGNOSIS — R73.9 HYPERGLYCEMIA: ICD-10-CM

## 2024-02-07 DIAGNOSIS — R79.89 ELEVATED TSH: ICD-10-CM

## 2024-02-07 DIAGNOSIS — K21.00 GASTROESOPHAGEAL REFLUX DISEASE WITH ESOPHAGITIS WITHOUT HEMORRHAGE: ICD-10-CM

## 2024-02-07 DIAGNOSIS — F41.9 ANXIETY: ICD-10-CM

## 2024-02-07 DIAGNOSIS — I10 PRIMARY HYPERTENSION: ICD-10-CM

## 2024-02-07 DIAGNOSIS — Z71.3 DIETARY COUNSELING AND SURVEILLANCE: ICD-10-CM

## 2024-02-07 DIAGNOSIS — E66.01 MORBID OBESITY (H): Primary | ICD-10-CM

## 2024-02-07 LAB
ALBUMIN SERPL BCG-MCNC: 4 G/DL (ref 3.5–5.2)
ALP SERPL-CCNC: 72 U/L (ref 40–150)
ALT SERPL W P-5'-P-CCNC: 66 U/L (ref 0–70)
ANION GAP SERPL CALCULATED.3IONS-SCNC: 9 MMOL/L (ref 7–15)
AST SERPL W P-5'-P-CCNC: 40 U/L (ref 0–45)
BILIRUB SERPL-MCNC: 0.3 MG/DL
BUN SERPL-MCNC: 13.6 MG/DL (ref 6–20)
CALCIUM SERPL-MCNC: 8.9 MG/DL (ref 8.6–10)
CHLORIDE SERPL-SCNC: 105 MMOL/L (ref 98–107)
CREAT SERPL-MCNC: 0.9 MG/DL (ref 0.67–1.17)
DEPRECATED HCO3 PLAS-SCNC: 25 MMOL/L (ref 22–29)
EGFRCR SERPLBLD CKD-EPI 2021: >90 ML/MIN/1.73M2
EST. AVERAGE GLUCOSE BLD GHB EST-MCNC: 114 MG/DL
GLUCOSE SERPL-MCNC: 117 MG/DL (ref 70–99)
HBA1C MFR BLD: 5.6 %
POTASSIUM SERPL-SCNC: 4.2 MMOL/L (ref 3.4–5.3)
PROT SERPL-MCNC: 6.9 G/DL (ref 6.4–8.3)
SODIUM SERPL-SCNC: 139 MMOL/L (ref 135–145)
TSH SERPL DL<=0.005 MIU/L-ACNC: 2.39 UIU/ML (ref 0.3–4.2)

## 2024-02-07 PROCEDURE — 80053 COMPREHEN METABOLIC PANEL: CPT | Performed by: NURSE PRACTITIONER

## 2024-02-07 PROCEDURE — 82306 VITAMIN D 25 HYDROXY: CPT | Performed by: NURSE PRACTITIONER

## 2024-02-07 PROCEDURE — 83036 HEMOGLOBIN GLYCOSYLATED A1C: CPT | Performed by: NURSE PRACTITIONER

## 2024-02-07 PROCEDURE — 99214 OFFICE O/P EST MOD 30 MIN: CPT | Performed by: NURSE PRACTITIONER

## 2024-02-07 PROCEDURE — 36415 COLL VENOUS BLD VENIPUNCTURE: CPT | Performed by: NURSE PRACTITIONER

## 2024-02-07 PROCEDURE — 84443 ASSAY THYROID STIM HORMONE: CPT | Performed by: NURSE PRACTITIONER

## 2024-02-07 PROCEDURE — 86376 MICROSOMAL ANTIBODY EACH: CPT | Performed by: NURSE PRACTITIONER

## 2024-02-07 NOTE — TELEPHONE ENCOUNTER
Results requested: lab results     Best number to reach patient at: [phone]     Best time to call patient: anytime    Send to care team in basket.

## 2024-02-08 LAB
THYROPEROXIDASE AB SERPL-ACNC: <10 IU/ML
VIT D+METAB SERPL-MCNC: 28 NG/ML (ref 20–50)

## 2024-02-26 ENCOUNTER — OFFICE VISIT (OUTPATIENT)
Dept: FAMILY MEDICINE | Facility: OTHER | Age: 39
End: 2024-02-26
Attending: NURSE PRACTITIONER
Payer: COMMERCIAL

## 2024-02-26 VITALS
DIASTOLIC BLOOD PRESSURE: 88 MMHG | RESPIRATION RATE: 16 BRPM | OXYGEN SATURATION: 94 % | HEART RATE: 94 BPM | SYSTOLIC BLOOD PRESSURE: 134 MMHG | WEIGHT: 315 LBS | BODY MASS INDEX: 53.58 KG/M2 | TEMPERATURE: 99.3 F

## 2024-02-26 DIAGNOSIS — E66.01 MORBID OBESITY (H): ICD-10-CM

## 2024-02-26 DIAGNOSIS — I10 PRIMARY HYPERTENSION: Primary | ICD-10-CM

## 2024-02-26 DIAGNOSIS — K21.00 GASTROESOPHAGEAL REFLUX DISEASE WITH ESOPHAGITIS WITHOUT HEMORRHAGE: ICD-10-CM

## 2024-02-26 DIAGNOSIS — R19.8 UMBILICAL BLEEDING: ICD-10-CM

## 2024-02-26 DIAGNOSIS — M54.42 CHRONIC BILATERAL LOW BACK PAIN WITH BILATERAL SCIATICA: ICD-10-CM

## 2024-02-26 DIAGNOSIS — M54.41 CHRONIC BILATERAL LOW BACK PAIN WITH BILATERAL SCIATICA: ICD-10-CM

## 2024-02-26 DIAGNOSIS — F41.9 ANXIETY: ICD-10-CM

## 2024-02-26 DIAGNOSIS — G89.29 CHRONIC BILATERAL LOW BACK PAIN WITH BILATERAL SCIATICA: ICD-10-CM

## 2024-02-26 PROCEDURE — 99214 OFFICE O/P EST MOD 30 MIN: CPT | Performed by: NURSE PRACTITIONER

## 2024-02-26 RX ORDER — MUPIROCIN 20 MG/G
OINTMENT TOPICAL 3 TIMES DAILY
Qty: 22 G | Refills: 1 | Status: SHIPPED | OUTPATIENT
Start: 2024-02-26

## 2024-02-26 RX ORDER — OMEPRAZOLE 40 MG/1
40 CAPSULE, DELAYED RELEASE ORAL DAILY
Qty: 90 CAPSULE | Refills: 0 | Status: SHIPPED | OUTPATIENT
Start: 2024-02-26 | End: 2024-06-25

## 2024-02-26 RX ORDER — ESCITALOPRAM OXALATE 20 MG/1
20 TABLET ORAL DAILY
Qty: 90 TABLET | Refills: 1 | Status: SHIPPED | OUTPATIENT
Start: 2024-02-26 | End: 2024-09-16

## 2024-02-26 RX ORDER — IBUPROFEN 800 MG/1
800 TABLET, FILM COATED ORAL EVERY 8 HOURS PRN
Qty: 60 TABLET | Refills: 3 | Status: SHIPPED | OUTPATIENT
Start: 2024-02-26

## 2024-02-26 RX ORDER — METOPROLOL SUCCINATE 50 MG/1
50 TABLET, EXTENDED RELEASE ORAL 2 TIMES DAILY
Qty: 180 TABLET | Refills: 0 | Status: SHIPPED | OUTPATIENT
Start: 2024-02-26 | End: 2024-06-25

## 2024-02-26 ASSESSMENT — PATIENT HEALTH QUESTIONNAIRE - PHQ9
SUM OF ALL RESPONSES TO PHQ QUESTIONS 1-9: 1
SUM OF ALL RESPONSES TO PHQ QUESTIONS 1-9: 1

## 2024-02-26 ASSESSMENT — PAIN SCALES - GENERAL: PAINLEVEL: NO PAIN (1)

## 2024-02-26 NOTE — PROGRESS NOTES
Assessment & Plan         Primary hypertension  - metoprolol succinate ER (TOPROL XL) 50 MG 24 hr tablet; Take 1 tablet (50 mg) by mouth 2 times daily      Anxiety  - escitalopram (LEXAPRO) 20 MG tablet; Take 1 tablet (20 mg) by mouth daily      Gastroesophageal reflux disease with esophagitis without hemorrhage  - omeprazole (PRILOSEC) 40 MG DR capsule; Take 1 capsule (40 mg) by mouth daily      Chronic bilateral low back pain with bilateral sciatica  - ibuprofen (ADVIL/MOTRIN) 800 MG tablet; Take 1 tablet (800 mg) by mouth every 8 hours as needed for moderate pain      Umbilical bleeding  - mupirocin (BACTROBAN) 2 % external ointment; Apply topically 3 times daily  - Follow-up if unimproved        Please continue to take all medication as directed  Please notify your pharmacy or our refill line of future refills needed.  Please return sooner than your next scheduled appointment with any concerns.          Emely Ana [facility]  [phone]           Nhan is a 39 year old, presenting for the following health issues:  Abdominal Pain          Concern - Naval started bleeding  Onset: 2/22/24  Description: Belly button bleeds intermittent   Intensity: moderate  Progression of Symptoms:  improving and intermittent  Accompanying Signs & Symptoms: None  Previous history of similar problem: None  Precipitating factors:        Worsened by: None  Alleviating factors:        Improved by: None  Therapies tried and outcome: None          Hypertension Follow-up  Do you check your blood pressure regularly outside of the clinic? No   Are you following a low salt diet? No  Are your blood pressures ever more than 140 on the top number (systolic) OR more   than 90 on the bottom number (diastolic), for example 140/90? No        Anxiety Follow-Up  How are you doing with your anxiety since your last visit? Improved   Are you having other symptoms that might be associated with anxiety? No  Have you had a significant life event? No   Are  you feeling depressed? No  Do you have any concerns with your use of alcohol or other drugs? No          Lumbar back pain  Chronic  MRI UTD  Would like a Rx for Ibuprofen        Social History     Tobacco Use    Smoking status: Former     Packs/day: 0.50     Years: 13.00     Additional pack years: 0.00     Total pack years: 6.50     Types: Cigarettes     Passive exposure: Past    Smokeless tobacco: Never    Tobacco comments:     tried to quit - longest period tobacco free: 2 years - no passive smoke exposure   Vaping Use    Vaping Use: Some days   Substance Use Topics    Alcohol use: Yes     Alcohol/week: 0.0 standard drinks of alcohol     Comment: occasion    Drug use: No               8/31/2020    10:00 AM 12/7/2021     4:08 PM 3/22/2023     2:55 PM   AL-7 SCORE   Total Score 2 15 4                 12/7/2021     4:08 PM 3/22/2023     2:55 PM 2/26/2024     2:57 PM   PHQ   PHQ-9 Total Score 4 2 1   Q9: Thoughts of better off dead/self-harm past 2 weeks Not at all Not at all Not at all           Answers submitted by the patient for this visit:  Patient Health Questionnaire (Submitted on 2/26/2024)  PHQ9 TOTAL SCORE: 1        Patient Active Problem List   Diagnosis    ACP (advance care planning)    Morbid obesity (H)    Primary hypertension    Anxiety    Gastroesophageal reflux disease with esophagitis without hemorrhage     Past Surgical History:   Procedure Laterality Date    CIRCUMCISION      COLONOSCOPY N/A 4/11/2016    Procedure: COLONOSCOPY;  Surgeon: Marely Hunt MD;  Location: [facility]    TONSILLECTOMY      wisdom teeth extracted         Social History     Tobacco Use    Smoking status: Former     Packs/day: 0.50     Years: 13.00     Additional pack years: 0.00     Total pack years: 6.50     Types: Cigarettes     Passive exposure: Past    Smokeless tobacco: Never    Tobacco comments:     tried to quit - longest period tobacco free: 2 years - no passive smoke exposure   Substance Use Topics    Alcohol use:  Yes     Alcohol/week: 0.0 standard drinks of alcohol     Comment: occasion     Family History   Problem Relation Age of Onset    C.A.D. Other         family h/o    Cerebrovascular Disease Other         CVA - family h/o               Current Outpatient Medications   Medication Sig Dispense Refill    escitalopram (LEXAPRO) 20 MG tablet TAKE 1 TABLET (20 MG) BY MOUTH DAILY 90 tablet 1    metoprolol succinate ER (TOPROL XL) 50 MG 24 hr tablet TAKE 1 TABLET (50 MG) BY MOUTH 2 TIMES DAILY 180 tablet 0    omeprazole (PRILOSEC) 40 MG DR capsule TAKE 1 CAPSULE (40 MG) BY MOUTH DAILY 90 capsule 0           Allergies   Allergen Reactions    Azithromycin Other (See Comments) and Hives     Zithromax - urticaria    Diphenhydramine Hcl Hives     Benadryl    Dust Mites            Recent Labs   Lab Test 02/07/24  0836 04/15/22  0939 12/07/21  1618 12/07/21  1618 10/14/16  1719 03/21/16  1608   A1C 5.6  --   --   --   --   --    LDL  --   --   --  88  --   --    HDL  --   --   --  32*  --   --    TRIG  --   --   --  141  --   --    ALT 66  --   --  49 70 88*   CR 0.90  --   --  1.03 0.98 0.94   GFRESTIMATED >90  --   --  >90 >90  Non  GFR Calc   >90  Non  GFR Calc     GFRESTBLACK  --   --   --   --  >90   GFR Calc   >90   GFR Calc     POTASSIUM 4.2  --   --  4.1 4.3 4.1   TSH 2.39 3.75   < > 4.42* 3.48 6.96*    < > = values in this interval not displayed.          BP Readings from Last 3 Encounters:   02/26/24 134/88   02/07/24 (!) 148/78   09/12/23 (!) 140/82    Wt Readings from Last 3 Encounters:   02/26/24 (!) 164.6 kg (362 lb 12.8 oz)   02/07/24 (!) 167.5 kg (369 lb 4.3 oz)   09/12/23 (!) 164.8 kg (363 lb 6.4 oz)                    Review of Systems  Constitutional, HEENT, cardiovascular, pulmonary, GI, , musculoskeletal, neuro, skin, endocrine and psych systems are negative, except as otherwise noted.          Objective    /88 (BP Location: Left arm, Patient  Position: Sitting, Cuff Size: Adult Large)   Pulse 94   Temp 99.3  F (37.4  C) (Tympanic)   Resp 16   Wt (!) 164.6 kg (362 lb 12.8 oz)   SpO2 94%   BMI 53.58 kg/m    Body mass index is 53.58 kg/m .          Physical Exam   GENERAL: alert and no distress  NECK: no adenopathy, no asymmetry, masses, or scars  RESP: lungs clear to auscultation - no rales, rhonchi or wheezes  CV: regular rate and rhythm, normal S1 S2, no S3 or S4, no murmur, click or rub, no peripheral edema  ABDOMEN: umbilical irritation  MS: lumbar stiffness  PSYCH: mentation appears normal, affect normal/bright            Signed Electronically by: Emely Perez CNP

## 2024-02-26 NOTE — PATIENT INSTRUCTIONS
Assessment & Plan         Primary hypertension  - metoprolol succinate ER (TOPROL XL) 50 MG 24 hr tablet; Take 1 tablet (50 mg) by mouth 2 times daily      Anxiety  - escitalopram (LEXAPRO) 20 MG tablet; Take 1 tablet (20 mg) by mouth daily      Gastroesophageal reflux disease with esophagitis without hemorrhage  - omeprazole (PRILOSEC) 40 MG DR capsule; Take 1 capsule (40 mg) by mouth daily      Chronic bilateral low back pain with bilateral sciatica  - ibuprofen (ADVIL/MOTRIN) 800 MG tablet; Take 1 tablet (800 mg) by mouth every 8 hours as needed for moderate pain      Umbilical bleeding  - mupirocin (BACTROBAN) 2 % external ointment; Apply topically 3 times daily  - Follow-up if unimproved        Please continue to take all medication as directed  Please notify your pharmacy or our refill line of future refills needed.  Please return sooner than your next scheduled appointment with any concerns.        Emely LEA  [phone]

## 2024-02-28 ENCOUNTER — TELEPHONE (OUTPATIENT)
Dept: FAMILY MEDICINE | Facility: OTHER | Age: 39
End: 2024-02-28

## 2024-02-28 NOTE — TELEPHONE ENCOUNTER
Received pa request from [facility] for semaglutide (OZEMPIC) 2 MG/3ML pen. Submitted on CMM, waiting for response.

## 2024-02-29 NOTE — TELEPHONE ENCOUNTER
RECEIVED DENIAL PA FROM [facility] FOR semaglutide (OZEMPIC) 2 MG/3ML pen. SCANNED INTO EPIC, MESSAGED PROVIDER.

## 2024-06-25 DIAGNOSIS — K21.00 GASTROESOPHAGEAL REFLUX DISEASE WITH ESOPHAGITIS WITHOUT HEMORRHAGE: ICD-10-CM

## 2024-06-25 DIAGNOSIS — I10 PRIMARY HYPERTENSION: ICD-10-CM

## 2024-06-25 RX ORDER — METOPROLOL SUCCINATE 50 MG/1
50 TABLET, EXTENDED RELEASE ORAL 2 TIMES DAILY
Qty: 180 TABLET | Refills: 2 | Status: SHIPPED | OUTPATIENT
Start: 2024-06-25

## 2024-06-25 RX ORDER — OMEPRAZOLE 40 MG/1
40 CAPSULE, DELAYED RELEASE ORAL DAILY
Qty: 90 CAPSULE | Refills: 2 | Status: SHIPPED | OUTPATIENT
Start: 2024-06-25

## 2024-08-29 ENCOUNTER — TELEPHONE (OUTPATIENT)
Dept: FAMILY MEDICINE | Facility: OTHER | Age: 39
End: 2024-08-29

## 2024-08-29 NOTE — TELEPHONE ENCOUNTER
"Writer spoke with patient and informed that he would need to see a covering provider or could go to UC. Patient stated \" I'll figure something out\" patient did not want to schedule with a provider at this time.  "

## 2024-08-29 NOTE — TELEPHONE ENCOUNTER
Patient is calling stating he might have pink eye. Patient is wanting to speak to Mike Schultz nurse. Would give a lot of information to writer. Please call patient back at [phone]

## 2024-09-30 DIAGNOSIS — F41.9 ANXIETY: ICD-10-CM

## 2024-09-30 RX ORDER — ESCITALOPRAM OXALATE 20 MG/1
20 TABLET ORAL DAILY
Qty: 30 TABLET | Refills: 0 | Status: SHIPPED | OUTPATIENT
Start: 2024-09-30

## 2024-09-30 NOTE — TELEPHONE ENCOUNTER
ESCITALOPRAM 20MG TABLET         Last Written Prescription Date:  9/16/24  Last Fill Quantity: 30,   # refills: 0  Last Office Visit: 2/26/24  Future Office visit:       Routing refill request to provider for review/approval because:    SSR[facility] Protocol [id number]/30/2024 01:34 AM   Protocol Details AL-7 score of less than 5 in past 6 months.           8/31/2020    10:00 AM 12/7/2021     4:08 PM 3/22/2023     2:55 PM   AL-7 SCORE   Total Score 2 15 4

## 2025-01-14 NOTE — PROGRESS NOTES
Assessment & Plan         Primary hypertension  - metoprolol succinate ER (TOPROL XL) 50 MG 24 hr tablet; Take 1 tablet (50 mg) by mouth 2 times daily.  - Comprehensive metabolic panel; Future  - Lipid Profile (Chol, Trig, HDL, LDL calc); Future  - TSH with free T4 reflex; Future        Gastroesophageal reflux disease with esophagitis without hemorrhage  - omeprazole (PRILOSEC) 40 MG DR capsule; Take 1 capsule (40 mg) by mouth daily.        Morbid obesity (H)  - semaglutide-weight management (WEGOVY) 0.25 MG/0.5ML pen; 0.25 mg weekly for 1 month, then 0.5 mg weekly for one 1 month then we will determine next dose        Anxiety  - escitalopram (LEXAPRO) 20 MG tablet; Take 1 tablet (20 mg) by mouth daily.        Chronic bilateral low back pain with bilateral sciatica  - ibuprofen (ADVIL/MOTRIN) 800 MG tablet; Take 1 tablet (800 mg) by mouth every 8 hours as needed for moderate pain.          Please continue to take all medication as directed  Please notify your pharmacy or our refill line of future refills needed.  Please return sooner than your next scheduled appointment with any concerns.        When your lab results return, we will call you with abnormal findings  You can also view this information in your MyChart, if you have an account.  Please call or [employer] message us with any questions you may have.            Emely Perez [facility]  [phone]             Nhan is a 40 year old, presenting for the following health issues:  Hypertension, Anxiety, and Gastrophageal Reflux          Hypertension Follow-up  Do you check your blood pressure regularly outside of the clinic? No   Are you following a low salt diet? No  Are your blood pressures ever more than 140 on the top number (systolic) OR more   than 90 on the bottom number (diastolic), for example 140/90? No does not check outside of clinic          Anxiety   How are you doing with your anxiety since your last visit? Improved   Are you having other symptoms that  might be associated with anxiety? No  Have you had a significant life event? No   Are you feeling depressed? No  Do you have any concerns with your use of alcohol or other drugs? No          Social History     Tobacco Use    Smoking status: Former     Current packs/day: 0.50     Average packs/day: 0.5 packs/day for 13.0 years (6.5 ttl pk-yrs)     Types: Cigarettes     Passive exposure: Past    Smokeless tobacco: Never    Tobacco comments:     tried to quit - longest period tobacco free: 2 years - no passive smoke exposure   Vaping Use    Vaping status: Some Days    Substances: Nicotine, Flavoring    Devices: Disposable   Substance Use Topics    Alcohol use: Yes     Alcohol/week: 0.0 standard drinks of alcohol     Comment: occasion    Drug use: No             12/7/2021     4:08 PM 3/22/2023     2:55 PM 1/15/2025     3:45 PM   AL-7 SCORE   Total Score   0 (minimal anxiety)   Total Score 15 4 0        Patient-reported             12/7/2021     4:08 PM 3/22/2023     2:55 PM 2/26/2024     2:57 PM   PHQ   PHQ-9 Total Score 4 2 1   Q9: Thoughts of better off dead/self-harm past 2 weeks Not at all Not at all Not at all         GERD/Heartburn  Onset/Duration: years  Description: improved on medication  Intensity: mild  Progression of Symptoms: improving  Accompanying Signs & Symptoms:  Does it feel like food gets stuck or trouble swallowing: No  Nausea: No  Vomiting (bloody?): No  Abdominal Pain: No  Black-Tarry stools: No  Bloody stools: No  History:  Previous similar episodes: No  Previous ulcers: No  Precipitating factors:   Caffeine use: YES  Alcohol use: YES  NSAID/Aspirin use: YES  Tobacco use: No  Worse with no particular food or drink.  Alleviating factors: None  Therapies tried and outcome:             Lifestyle changes: None            Medications: Omeprazole (Prilosec)          Patient Active Problem List   Diagnosis    ACP (advance care planning)    Morbid obesity (H)    Primary hypertension    Anxiety     Gastroesophageal reflux disease with esophagitis without hemorrhage    Chronic bilateral low back pain with bilateral sciatica     Past Surgical History:   Procedure Laterality Date    CIRCUMCISION      COLONOSCOPY N/A 4/11/2016    Procedure: COLONOSCOPY;  Surgeon: Marely Hunt MD;  Location: [facility]    TONSILLECTOMY      wisdom teeth extracted         Social History     Tobacco Use    Smoking status: Former     Current packs/day: 0.50     Average packs/day: 0.5 packs/day for 13.0 years (6.5 ttl pk-yrs)     Types: Cigarettes     Passive exposure: Past    Smokeless tobacco: Never    Tobacco comments:     tried to quit - longest period tobacco free: 2 years - no passive smoke exposure   Substance Use Topics    Alcohol use: Yes     Alcohol/week: 0.0 standard drinks of alcohol     Comment: occasion     Family History   Problem Relation Age of Onset    C.A.D. Other         family h/o    Cerebrovascular Disease Other         CVA - family h/o             Current Outpatient Medications   Medication Sig Dispense Refill    escitalopram (LEXAPRO) 20 MG tablet TAKE 1 TABLET (20 MG) BY MOUTH DAILY 30 tablet 0    ibuprofen (ADVIL/MOTRIN) 800 MG tablet Take 1 tablet (800 mg) by mouth every 8 hours as needed for moderate pain 60 tablet 3    metoprolol succinate ER (TOPROL XL) 50 MG 24 hr tablet TAKE 1 TABLET (50 MG) BY MOUTH 2 TIMES DAILY 180 tablet 2    omeprazole (PRILOSEC) 40 MG DR capsule TAKE 1 CAPSULE (40 MG) BY MOUTH DAILY 90 capsule 2    mupirocin (BACTROBAN) 2 % external ointment Apply topically 3 times daily (Patient not taking: Reported on 1/15/2025) 22 g 1    semaglutide (OZEMPIC) 2 MG/3ML pen 0.25 mg weekly for 1 month then 0.5 mg weekly for 1 month (Patient not taking: Reported on 1/15/2025) 3 mL 1     Allergies   Allergen Reactions    Azithromycin Other (See Comments) and Hives     Zithromax - urticaria    Diphenhydramine Hcl Hives     Benadryl    Dust Mites          Recent Labs   Lab Test 02/07/24  0836  04/15/22  0939 12/07/21  1618   A1C 5.6  --   --    LDL  --   --  88   HDL  --   --  32*   TRIG  --   --  141   ALT 66  --  49   CR 0.90  --  1.03   GFRESTIMATED >90  --  >90   POTASSIUM 4.2  --  4.1   TSH 2.39 3.75 4.42*          BP Readings from Last 3 Encounters:   01/15/25 116/82   02/26/24 134/88   02/07/24 (!) 148/78    Wt Readings from Last 3 Encounters:   01/15/25 (!) 170.6 kg (376 lb)   02/26/24 (!) 164.6 kg (362 lb 12.8 oz)   02/07/24 (!) 167.5 kg (369 lb 4.3 oz)                   Review of Systems  Constitutional, neuro, ENT, endocrine, pulmonary, cardiac, gastrointestinal, genitourinary, musculoskeletal, integument and psychiatric systems are negative, except as otherwise noted.          Objective    /82 (BP Location: Left arm, Patient Position: Sitting, Cuff Size: Adult Large)   Pulse 87   Temp 98.7  F (37.1  C) (Tympanic)   Resp 18   Wt (!) 170.6 kg (376 lb)   SpO2 94%   BMI 55.53 kg/m    Body mass index is 55.53 kg/m .        Physical Exam   GENERAL: alert and no distress  EYES: Eyes grossly normal to inspection, PERRL and conjunctivae and sclerae normal  HENT: ear canals and TM's normal, nose and mouth without ulcers or lesions  NECK: no adenopathy, no asymmetry, masses, or scars  RESP: lungs clear to auscultation - no rales, rhonchi or wheezes  CV: regular rate and rhythm, normal S1 S2, no S3 or S4, no murmur, click or rub, no peripheral edema  MS: no gross musculoskeletal defects noted, no edema  SKIN: no suspicious lesions or rashes  PSYCH: mentation appears normal, affect normal/bright        The longitudinal plan of care for the diagnosis(es)/condition(s) as documented were addressed during this visit. Due to the added complexity in care, I will continue to support Nhan in the subsequent management and with ongoing continuity of care.           Signed Electronically by: Emely Perez CNP

## 2025-01-15 ENCOUNTER — TELEPHONE (OUTPATIENT)
Dept: FAMILY MEDICINE | Facility: OTHER | Age: 40
End: 2025-01-15

## 2025-01-15 ENCOUNTER — OFFICE VISIT (OUTPATIENT)
Dept: FAMILY MEDICINE | Facility: OTHER | Age: 40
End: 2025-01-15
Attending: NURSE PRACTITIONER
Payer: COMMERCIAL

## 2025-01-15 VITALS
DIASTOLIC BLOOD PRESSURE: 82 MMHG | SYSTOLIC BLOOD PRESSURE: 116 MMHG | TEMPERATURE: 98.7 F | HEART RATE: 87 BPM | OXYGEN SATURATION: 94 % | BODY MASS INDEX: 55.53 KG/M2 | RESPIRATION RATE: 18 BRPM | WEIGHT: 315 LBS

## 2025-01-15 DIAGNOSIS — M54.41 CHRONIC BILATERAL LOW BACK PAIN WITH BILATERAL SCIATICA: ICD-10-CM

## 2025-01-15 DIAGNOSIS — I10 PRIMARY HYPERTENSION: Primary | ICD-10-CM

## 2025-01-15 DIAGNOSIS — K21.00 GASTROESOPHAGEAL REFLUX DISEASE WITH ESOPHAGITIS WITHOUT HEMORRHAGE: ICD-10-CM

## 2025-01-15 DIAGNOSIS — F41.9 ANXIETY: ICD-10-CM

## 2025-01-15 DIAGNOSIS — M54.42 CHRONIC BILATERAL LOW BACK PAIN WITH BILATERAL SCIATICA: ICD-10-CM

## 2025-01-15 DIAGNOSIS — G89.29 CHRONIC BILATERAL LOW BACK PAIN WITH BILATERAL SCIATICA: ICD-10-CM

## 2025-01-15 DIAGNOSIS — E66.01 MORBID OBESITY (H): ICD-10-CM

## 2025-01-15 LAB — HOLD SPECIMEN: NORMAL

## 2025-01-15 RX ORDER — OMEPRAZOLE 40 MG/1
40 CAPSULE, DELAYED RELEASE ORAL DAILY
Qty: 90 CAPSULE | Refills: 2 | Status: SHIPPED | OUTPATIENT
Start: 2025-01-15

## 2025-01-15 RX ORDER — METOPROLOL SUCCINATE 50 MG/1
50 TABLET, EXTENDED RELEASE ORAL 2 TIMES DAILY
Qty: 180 TABLET | Refills: 2 | Status: SHIPPED | OUTPATIENT
Start: 2025-01-15

## 2025-01-15 RX ORDER — IBUPROFEN 800 MG/1
800 TABLET, FILM COATED ORAL EVERY 8 HOURS PRN
Qty: 60 TABLET | Refills: 3 | Status: SHIPPED | OUTPATIENT
Start: 2025-01-15

## 2025-01-15 RX ORDER — ESCITALOPRAM OXALATE 20 MG/1
20 TABLET ORAL DAILY
Qty: 90 TABLET | Refills: 1 | Status: SHIPPED | OUTPATIENT
Start: 2025-01-15

## 2025-01-15 ASSESSMENT — ANXIETY QUESTIONNAIRES
6. BECOMING EASILY ANNOYED OR IRRITABLE: NOT AT ALL
3. WORRYING TOO MUCH ABOUT DIFFERENT THINGS: NOT AT ALL
4. TROUBLE RELAXING: NOT AT ALL
GAD7 TOTAL SCORE: 0
GAD7 TOTAL SCORE: 0
7. FEELING AFRAID AS IF SOMETHING AWFUL MIGHT HAPPEN: NOT AT ALL
2. NOT BEING ABLE TO STOP OR CONTROL WORRYING: NOT AT ALL
7. FEELING AFRAID AS IF SOMETHING AWFUL MIGHT HAPPEN: NOT AT ALL
1. FEELING NERVOUS, ANXIOUS, OR ON EDGE: NOT AT ALL
GAD7 TOTAL SCORE: 0
5. BEING SO RESTLESS THAT IT IS HARD TO SIT STILL: NOT AT ALL

## 2025-01-15 ASSESSMENT — PAIN SCALES - GENERAL: PAINLEVEL_OUTOF10: NO PAIN (0)

## 2025-01-15 NOTE — PATIENT INSTRUCTIONS
Assessment & Plan         Primary hypertension  - metoprolol succinate ER (TOPROL XL) 50 MG 24 hr tablet; Take 1 tablet (50 mg) by mouth 2 times daily.  - Comprehensive metabolic panel; Future  - Lipid Profile (Chol, Trig, HDL, LDL calc); Future  - TSH with free T4 reflex; Future      Gastroesophageal reflux disease with esophagitis without hemorrhage  - omeprazole (PRILOSEC) 40 MG DR capsule; Take 1 capsule (40 mg) by mouth daily.      Morbid obesity (H)  - semaglutide-weight management (WEGOVY) 0.25 MG/0.5ML pen; 0.25 mg weekly for 1 month, then 0.5 mg weekly for one 1 month then we will determine next dose      Anxiety  - escitalopram (LEXAPRO) 20 MG tablet; Take 1 tablet (20 mg) by mouth daily.      Chronic bilateral low back pain with bilateral sciatica  - ibuprofen (ADVIL/MOTRIN) 800 MG tablet; Take 1 tablet (800 mg) by mouth every 8 hours as needed for moderate pain.          Please continue to take all medication as directed  Please notify your pharmacy or our refill line of future refills needed.  Please return sooner than your next scheduled appointment with any concerns.      When your lab results return, we will call you with abnormal findings  You can also view this information in your MyChart, if you have an account.  Please call or [employer] message us with any questions you may have.            Emely LEA  [phone]

## 2025-01-15 NOTE — TELEPHONE ENCOUNTER
Received APPROVAL from [facility] regarding omeprazole (PRILOSEC) 40 MG DR capsule effective 1/15/25-1/15/28

## 2025-01-16 LAB
ALBUMIN SERPL BCG-MCNC: 4.3 G/DL (ref 3.5–5.2)
ALP SERPL-CCNC: 66 U/L (ref 40–150)
ALT SERPL W P-5'-P-CCNC: 47 U/L (ref 0–70)
ANION GAP SERPL CALCULATED.3IONS-SCNC: 14 MMOL/L (ref 7–15)
AST SERPL W P-5'-P-CCNC: 34 U/L (ref 0–45)
BILIRUB SERPL-MCNC: 0.6 MG/DL
BUN SERPL-MCNC: 11.6 MG/DL (ref 6–20)
CALCIUM SERPL-MCNC: 9.6 MG/DL (ref 8.8–10.4)
CHLORIDE SERPL-SCNC: 104 MMOL/L (ref 98–107)
CHOLEST SERPL-MCNC: 153 MG/DL
CREAT SERPL-MCNC: 1.05 MG/DL (ref 0.67–1.17)
EGFRCR SERPLBLD CKD-EPI 2021: >90 ML/MIN/1.73M2
FASTING STATUS PATIENT QL REPORTED: NO
FASTING STATUS PATIENT QL REPORTED: NO
GLUCOSE SERPL-MCNC: 99 MG/DL (ref 70–99)
HCO3 SERPL-SCNC: 22 MMOL/L (ref 22–29)
HDLC SERPL-MCNC: 31 MG/DL
LDLC SERPL CALC-MCNC: 104 MG/DL
NONHDLC SERPL-MCNC: 122 MG/DL
POTASSIUM SERPL-SCNC: 4.3 MMOL/L (ref 3.4–5.3)
PROT SERPL-MCNC: 7.7 G/DL (ref 6.4–8.3)
SODIUM SERPL-SCNC: 140 MMOL/L (ref 135–145)
TRIGL SERPL-MCNC: 92 MG/DL
TSH SERPL DL<=0.005 MIU/L-ACNC: 2.3 UIU/ML (ref 0.3–4.2)

## 2025-01-21 ENCOUNTER — TELEPHONE (OUTPATIENT)
Dept: FAMILY MEDICINE | Facility: OTHER | Age: 40
End: 2025-01-21

## 2025-01-21 NOTE — TELEPHONE ENCOUNTER
Received PA request regarding semaglutide-weight management (WEGOVY) 0.25 MG/0.5ML pen. Submitted via CM, awaiting response

## 2025-01-22 NOTE — TELEPHONE ENCOUNTER
Received APPROVAL from [facility] regarding semaglutide-weight management (WEGOVY) 0.25 MG/0.5ML pen effective 1/22/25-8/20/25

## 2025-01-28 ENCOUNTER — TELEPHONE (OUTPATIENT)
Dept: FAMILY MEDICINE | Facility: OTHER | Age: 40
End: 2025-01-28

## 2025-01-28 DIAGNOSIS — E66.01 MORBID OBESITY (H): ICD-10-CM

## 2025-01-28 NOTE — TELEPHONE ENCOUNTER
12:42 PM    Reason for Call: Phone Call    Description: Pt's wife Leydi called states her  has been prescribed to Wegovy. States that old insurance approved it but the new insurance has not. Wondering if pt or pt's wife could  a paper copy so that they can go to the different pharmacies and see who has the best price. She states the Wegovy is very expensive at their normal pharmacy. Please call pt's wife back with update    Was an appointment offered for this call? No  If yes : Appointment type              Date    Preferred method for responding to this message: Telephone Call  What is your phone number ? Leydi's number : [phone]    If we cannot reach you directly, may we leave a detailed response at the number you provided? Yes    Can this message wait until your PCP/provider returns, if available today? Not applicable    Karen Cardona

## 2025-06-25 DIAGNOSIS — F41.9 ANXIETY: ICD-10-CM

## 2025-06-25 RX ORDER — ESCITALOPRAM OXALATE 20 MG/1
20 TABLET ORAL DAILY
Qty: 90 TABLET | Refills: 0 | Status: SHIPPED | OUTPATIENT
Start: 2025-06-25